# Patient Record
Sex: MALE | Race: BLACK OR AFRICAN AMERICAN | Employment: FULL TIME | URBAN - METROPOLITAN AREA
[De-identification: names, ages, dates, MRNs, and addresses within clinical notes are randomized per-mention and may not be internally consistent; named-entity substitution may affect disease eponyms.]

---

## 2018-04-24 ENCOUNTER — APPOINTMENT (OUTPATIENT)
Dept: GENERAL RADIOLOGY | Age: 63
DRG: 281 | End: 2018-04-24
Attending: PHYSICIAN ASSISTANT
Payer: COMMERCIAL

## 2018-04-24 ENCOUNTER — HOSPITAL ENCOUNTER (INPATIENT)
Age: 63
LOS: 2 days | Discharge: HOME | DRG: 281 | End: 2018-04-26
Attending: EMERGENCY MEDICINE | Admitting: HOSPITALIST
Payer: COMMERCIAL

## 2018-04-24 DIAGNOSIS — I21.4 NSTEMI (NON-ST ELEVATED MYOCARDIAL INFARCTION) (HCC): Primary | ICD-10-CM

## 2018-04-24 PROBLEM — I16.1 HYPERTENSIVE EMERGENCY: Status: ACTIVE | Noted: 2018-04-24

## 2018-04-24 LAB
ACT BLD: 143 SECS (ref 79–138)
ALBUMIN SERPL-MCNC: 3.8 G/DL (ref 3.4–5)
ALBUMIN/GLOB SERPL: 0.8 {RATIO} (ref 0.8–1.7)
ALP SERPL-CCNC: 87 U/L (ref 45–117)
ALT SERPL-CCNC: 31 U/L (ref 16–61)
AMPHET UR QL SCN: NEGATIVE
ANION GAP SERPL CALC-SCNC: 9 MMOL/L (ref 3–18)
APPEARANCE UR: CLEAR
APTT PPP: 31.2 SEC (ref 23–36.4)
APTT PPP: 62.9 SEC (ref 23–36.4)
AST SERPL-CCNC: 29 U/L (ref 15–37)
BACTERIA URNS QL MICRO: NEGATIVE /HPF
BARBITURATES UR QL SCN: NEGATIVE
BASOPHILS # BLD: 0 K/UL (ref 0–0.06)
BASOPHILS NFR BLD: 1 % (ref 0–2)
BENZODIAZ UR QL: NEGATIVE
BILIRUB SERPL-MCNC: 1.2 MG/DL (ref 0.2–1)
BILIRUB UR QL: NEGATIVE
BNP SERPL-MCNC: 1545 PG/ML (ref 0–900)
BUN SERPL-MCNC: 15 MG/DL (ref 7–18)
BUN/CREAT SERPL: 13 (ref 12–20)
CALCIUM SERPL-MCNC: 9 MG/DL (ref 8.5–10.1)
CANNABINOIDS UR QL SCN: NEGATIVE
CHLORIDE SERPL-SCNC: 104 MMOL/L (ref 100–108)
CK MB CFR SERPL CALC: 2.9 % (ref 0–4)
CK MB CFR SERPL CALC: 3.3 % (ref 0–4)
CK MB SERPL-MCNC: 1.9 NG/ML (ref 5–25)
CK MB SERPL-MCNC: 2 NG/ML (ref 5–25)
CK MB SERPL-MCNC: 2.1 NG/ML (ref 5–25)
CK MB SERPL-MCNC: 2.3 NG/ML (ref 5–25)
CK SERPL-CCNC: 64 U/L (ref 39–308)
CK SERPL-CCNC: 65 U/L (ref 39–308)
CK SERPL-CCNC: 69 U/L (ref 39–308)
CK SERPL-CCNC: 78 U/L (ref 39–308)
CO2 SERPL-SCNC: 28 MMOL/L (ref 21–32)
COCAINE UR QL SCN: NEGATIVE
COLOR UR: YELLOW
CREAT SERPL-MCNC: 1.15 MG/DL (ref 0.6–1.3)
DIFFERENTIAL METHOD BLD: ABNORMAL
EOSINOPHIL # BLD: 0.1 K/UL (ref 0–0.4)
EOSINOPHIL NFR BLD: 2 % (ref 0–5)
EPITH CASTS URNS QL MICRO: NORMAL /LPF (ref 0–5)
ERYTHROCYTE [DISTWIDTH] IN BLOOD BY AUTOMATED COUNT: 15.4 % (ref 11.6–14.5)
GLOBULIN SER CALC-MCNC: 4.8 G/DL (ref 2–4)
GLUCOSE BLD STRIP.AUTO-MCNC: 104 MG/DL (ref 70–110)
GLUCOSE BLD STRIP.AUTO-MCNC: 154 MG/DL (ref 70–110)
GLUCOSE SERPL-MCNC: 124 MG/DL (ref 74–99)
GLUCOSE UR STRIP.AUTO-MCNC: NEGATIVE MG/DL
HCT VFR BLD AUTO: 43.3 % (ref 36–48)
HDSCOM,HDSCOM: NORMAL
HGB BLD-MCNC: 14.3 G/DL (ref 13–16)
HGB UR QL STRIP: ABNORMAL
KETONES UR QL STRIP.AUTO: NEGATIVE MG/DL
LEUKOCYTE ESTERASE UR QL STRIP.AUTO: NEGATIVE
LYMPHOCYTES # BLD: 1.3 K/UL (ref 0.9–3.6)
LYMPHOCYTES NFR BLD: 35 % (ref 21–52)
MAGNESIUM SERPL-MCNC: 1.7 MG/DL (ref 1.6–2.6)
MCH RBC QN AUTO: 30.2 PG (ref 24–34)
MCHC RBC AUTO-ENTMCNC: 33 G/DL (ref 31–37)
MCV RBC AUTO: 91.5 FL (ref 74–97)
METHADONE UR QL: NEGATIVE
MONOCYTES # BLD: 0.3 K/UL (ref 0.05–1.2)
MONOCYTES NFR BLD: 8 % (ref 3–10)
NEUTS SEG # BLD: 2 K/UL (ref 1.8–8)
NEUTS SEG NFR BLD: 54 % (ref 40–73)
NITRITE UR QL STRIP.AUTO: NEGATIVE
OPIATES UR QL: NEGATIVE
PCP UR QL: NEGATIVE
PH UR STRIP: 6.5 [PH] (ref 5–8)
PLATELET # BLD AUTO: 217 K/UL (ref 135–420)
PMV BLD AUTO: 9.7 FL (ref 9.2–11.8)
POTASSIUM SERPL-SCNC: 3.5 MMOL/L (ref 3.5–5.5)
PROT SERPL-MCNC: 8.6 G/DL (ref 6.4–8.2)
PROT UR STRIP-MCNC: NEGATIVE MG/DL
RBC # BLD AUTO: 4.73 M/UL (ref 4.7–5.5)
RBC #/AREA URNS HPF: NORMAL /HPF (ref 0–5)
SODIUM SERPL-SCNC: 141 MMOL/L (ref 136–145)
SP GR UR REFRACTOMETRY: 1.01 (ref 1–1.03)
TROPONIN I SERPL-MCNC: 2.08 NG/ML (ref 0–0.06)
TROPONIN I SERPL-MCNC: 2.15 NG/ML (ref 0–0.06)
TROPONIN I SERPL-MCNC: 2.24 NG/ML (ref 0–0.06)
TROPONIN I SERPL-MCNC: 2.29 NG/ML (ref 0–0.06)
UROBILINOGEN UR QL STRIP.AUTO: 0.2 EU/DL (ref 0.2–1)
WBC # BLD AUTO: 3.7 K/UL (ref 4.6–13.2)
WBC URNS QL MICRO: NORMAL /HPF (ref 0–5)

## 2018-04-24 PROCEDURE — B2151ZZ FLUOROSCOPY OF LEFT HEART USING LOW OSMOLAR CONTRAST: ICD-10-PCS | Performed by: INTERNAL MEDICINE

## 2018-04-24 PROCEDURE — 82550 ASSAY OF CK (CPK): CPT | Performed by: PHYSICIAN ASSISTANT

## 2018-04-24 PROCEDURE — B4181ZZ FLUOROSCOPY OF BILATERAL RENAL ARTERIES USING LOW OSMOLAR CONTRAST: ICD-10-PCS | Performed by: INTERNAL MEDICINE

## 2018-04-24 PROCEDURE — B2111ZZ FLUOROSCOPY OF MULTIPLE CORONARY ARTERIES USING LOW OSMOLAR CONTRAST: ICD-10-PCS | Performed by: INTERNAL MEDICINE

## 2018-04-24 PROCEDURE — 71045 X-RAY EXAM CHEST 1 VIEW: CPT

## 2018-04-24 PROCEDURE — 85025 COMPLETE CBC W/AUTO DIFF WBC: CPT | Performed by: PHYSICIAN ASSISTANT

## 2018-04-24 PROCEDURE — 74011250636 HC RX REV CODE- 250/636: Performed by: PHYSICIAN ASSISTANT

## 2018-04-24 PROCEDURE — 74011250636 HC RX REV CODE- 250/636: Performed by: INTERNAL MEDICINE

## 2018-04-24 PROCEDURE — 36415 COLL VENOUS BLD VENIPUNCTURE: CPT | Performed by: PHYSICIAN ASSISTANT

## 2018-04-24 PROCEDURE — 96375 TX/PRO/DX INJ NEW DRUG ADDON: CPT

## 2018-04-24 PROCEDURE — 74011000250 HC RX REV CODE- 250: Performed by: PHYSICIAN ASSISTANT

## 2018-04-24 PROCEDURE — 99285 EMERGENCY DEPT VISIT HI MDM: CPT

## 2018-04-24 PROCEDURE — 65610000006 HC RM INTENSIVE CARE

## 2018-04-24 PROCEDURE — 96374 THER/PROPH/DIAG INJ IV PUSH: CPT

## 2018-04-24 PROCEDURE — 74011250637 HC RX REV CODE- 250/637: Performed by: INTERNAL MEDICINE

## 2018-04-24 PROCEDURE — 85347 COAGULATION TIME ACTIVATED: CPT

## 2018-04-24 PROCEDURE — 84443 ASSAY THYROID STIM HORMONE: CPT | Performed by: INTERNAL MEDICINE

## 2018-04-24 PROCEDURE — 85730 THROMBOPLASTIN TIME PARTIAL: CPT | Performed by: PHYSICIAN ASSISTANT

## 2018-04-24 PROCEDURE — 74011000250 HC RX REV CODE- 250: Performed by: INTERNAL MEDICINE

## 2018-04-24 PROCEDURE — 81001 URINALYSIS AUTO W/SCOPE: CPT | Performed by: PHYSICIAN ASSISTANT

## 2018-04-24 PROCEDURE — 4A023N7 MEASUREMENT OF CARDIAC SAMPLING AND PRESSURE, LEFT HEART, PERCUTANEOUS APPROACH: ICD-10-PCS | Performed by: INTERNAL MEDICINE

## 2018-04-24 PROCEDURE — 74011250637 HC RX REV CODE- 250/637: Performed by: HOSPITALIST

## 2018-04-24 PROCEDURE — 80053 COMPREHEN METABOLIC PANEL: CPT | Performed by: PHYSICIAN ASSISTANT

## 2018-04-24 PROCEDURE — 74011636320 HC RX REV CODE- 636/320: Performed by: INTERNAL MEDICINE

## 2018-04-24 PROCEDURE — 74011250636 HC RX REV CODE- 250/636

## 2018-04-24 PROCEDURE — 74011636637 HC RX REV CODE- 636/637: Performed by: HOSPITALIST

## 2018-04-24 PROCEDURE — 74011250637 HC RX REV CODE- 250/637: Performed by: PHYSICIAN ASSISTANT

## 2018-04-24 PROCEDURE — 80307 DRUG TEST PRSMV CHEM ANLYZR: CPT | Performed by: INTERNAL MEDICINE

## 2018-04-24 PROCEDURE — 93005 ELECTROCARDIOGRAM TRACING: CPT

## 2018-04-24 PROCEDURE — 83880 ASSAY OF NATRIURETIC PEPTIDE: CPT | Performed by: PHYSICIAN ASSISTANT

## 2018-04-24 PROCEDURE — C1894 INTRO/SHEATH, NON-LASER: HCPCS

## 2018-04-24 PROCEDURE — 82962 GLUCOSE BLOOD TEST: CPT

## 2018-04-24 PROCEDURE — 74011000250 HC RX REV CODE- 250

## 2018-04-24 PROCEDURE — 93306 TTE W/DOPPLER COMPLETE: CPT

## 2018-04-24 PROCEDURE — 83735 ASSAY OF MAGNESIUM: CPT | Performed by: INTERNAL MEDICINE

## 2018-04-24 RX ORDER — HEPARIN SODIUM 1000 [USP'U]/ML
50.4 INJECTION, SOLUTION INTRAVENOUS; SUBCUTANEOUS ONCE
Status: COMPLETED | OUTPATIENT
Start: 2018-04-24 | End: 2018-04-24

## 2018-04-24 RX ORDER — HEPARIN SODIUM 1000 [USP'U]/ML
INJECTION, SOLUTION INTRAVENOUS; SUBCUTANEOUS
Status: DISCONTINUED
Start: 2018-04-24 | End: 2018-04-24

## 2018-04-24 RX ORDER — ADHESIVE BANDAGE
30 BANDAGE TOPICAL DAILY PRN
Status: DISCONTINUED | OUTPATIENT
Start: 2018-04-24 | End: 2018-04-26 | Stop reason: HOSPADM

## 2018-04-24 RX ORDER — DEXTROSE 50 % IN WATER (D50W) INTRAVENOUS SYRINGE
50 AS NEEDED
Status: DISCONTINUED | OUTPATIENT
Start: 2018-04-24 | End: 2018-04-26 | Stop reason: HOSPADM

## 2018-04-24 RX ORDER — INSULIN LISPRO 100 [IU]/ML
INJECTION, SOLUTION INTRAVENOUS; SUBCUTANEOUS
Status: DISCONTINUED | OUTPATIENT
Start: 2018-04-24 | End: 2018-04-25

## 2018-04-24 RX ORDER — FENTANYL CITRATE 50 UG/ML
INJECTION, SOLUTION INTRAMUSCULAR; INTRAVENOUS
Status: COMPLETED
Start: 2018-04-24 | End: 2018-04-24

## 2018-04-24 RX ORDER — HEPARIN SODIUM 10000 [USP'U]/100ML
12-25 INJECTION, SOLUTION INTRAVENOUS
Status: DISCONTINUED | OUTPATIENT
Start: 2018-04-24 | End: 2018-04-24

## 2018-04-24 RX ORDER — SODIUM CHLORIDE 0.9 % (FLUSH) 0.9 %
5-10 SYRINGE (ML) INJECTION AS NEEDED
Status: DISCONTINUED | OUTPATIENT
Start: 2018-04-24 | End: 2018-04-26 | Stop reason: HOSPADM

## 2018-04-24 RX ORDER — MIDAZOLAM HYDROCHLORIDE 1 MG/ML
INJECTION, SOLUTION INTRAMUSCULAR; INTRAVENOUS
Status: COMPLETED
Start: 2018-04-24 | End: 2018-04-24

## 2018-04-24 RX ORDER — LIDOCAINE HYDROCHLORIDE 10 MG/ML
INJECTION, SOLUTION EPIDURAL; INFILTRATION; INTRACAUDAL; PERINEURAL
Status: COMPLETED
Start: 2018-04-24 | End: 2018-04-24

## 2018-04-24 RX ORDER — HYDRALAZINE HYDROCHLORIDE 20 MG/ML
10-20 INJECTION INTRAMUSCULAR; INTRAVENOUS
Status: DISCONTINUED | OUTPATIENT
Start: 2018-04-24 | End: 2018-04-24

## 2018-04-24 RX ORDER — NITROGLYCERIN 40 MG/100ML
0-200 INJECTION INTRAVENOUS
Status: DISCONTINUED | OUTPATIENT
Start: 2018-04-24 | End: 2018-04-26 | Stop reason: HOSPADM

## 2018-04-24 RX ORDER — HEPARIN SODIUM 1000 [USP'U]/ML
4000 INJECTION, SOLUTION INTRAVENOUS; SUBCUTANEOUS
Status: DISCONTINUED | OUTPATIENT
Start: 2018-04-24 | End: 2018-04-24 | Stop reason: HOSPADM

## 2018-04-24 RX ORDER — FENTANYL CITRATE 50 UG/ML
25-100 INJECTION, SOLUTION INTRAMUSCULAR; INTRAVENOUS
Status: DISCONTINUED | OUTPATIENT
Start: 2018-04-24 | End: 2018-04-24 | Stop reason: HOSPADM

## 2018-04-24 RX ORDER — FUROSEMIDE 10 MG/ML
40 INJECTION INTRAMUSCULAR; INTRAVENOUS
Status: COMPLETED | OUTPATIENT
Start: 2018-04-24 | End: 2018-04-24

## 2018-04-24 RX ORDER — HYDRALAZINE HYDROCHLORIDE 20 MG/ML
10 INJECTION INTRAMUSCULAR; INTRAVENOUS
Status: DISCONTINUED | OUTPATIENT
Start: 2018-04-24 | End: 2018-04-26 | Stop reason: HOSPADM

## 2018-04-24 RX ORDER — MAGNESIUM SULFATE HEPTAHYDRATE 40 MG/ML
2 INJECTION, SOLUTION INTRAVENOUS ONCE
Status: COMPLETED | OUTPATIENT
Start: 2018-04-24 | End: 2018-04-24

## 2018-04-24 RX ORDER — VERAPAMIL HYDROCHLORIDE 2.5 MG/ML
5 INJECTION, SOLUTION INTRAVENOUS ONCE
Status: COMPLETED | OUTPATIENT
Start: 2018-04-24 | End: 2018-04-24

## 2018-04-24 RX ORDER — LABETALOL HYDROCHLORIDE 5 MG/ML
20 INJECTION, SOLUTION INTRAVENOUS ONCE
Status: COMPLETED | OUTPATIENT
Start: 2018-04-24 | End: 2018-04-24

## 2018-04-24 RX ORDER — POTASSIUM CHLORIDE 20 MEQ/1
40 TABLET, EXTENDED RELEASE ORAL
Status: ACTIVE | OUTPATIENT
Start: 2018-04-24 | End: 2018-04-25

## 2018-04-24 RX ORDER — ASPIRIN 81 MG/1
81 TABLET ORAL DAILY
Status: DISCONTINUED | OUTPATIENT
Start: 2018-04-25 | End: 2018-04-24 | Stop reason: SDUPTHER

## 2018-04-24 RX ORDER — GUAIFENESIN 100 MG/5ML
243 LIQUID (ML) ORAL
Status: COMPLETED | OUTPATIENT
Start: 2018-04-24 | End: 2018-04-24

## 2018-04-24 RX ORDER — LIDOCAINE HYDROCHLORIDE 10 MG/ML
3-20 INJECTION, SOLUTION EPIDURAL; INFILTRATION; INTRACAUDAL; PERINEURAL
Status: DISCONTINUED | OUTPATIENT
Start: 2018-04-24 | End: 2018-04-26 | Stop reason: HOSPADM

## 2018-04-24 RX ORDER — HYDRALAZINE HYDROCHLORIDE 20 MG/ML
INJECTION INTRAMUSCULAR; INTRAVENOUS
Status: COMPLETED
Start: 2018-04-24 | End: 2018-04-24

## 2018-04-24 RX ORDER — PRAVASTATIN SODIUM 20 MG/1
20 TABLET ORAL
Status: DISCONTINUED | OUTPATIENT
Start: 2018-04-24 | End: 2018-04-26 | Stop reason: HOSPADM

## 2018-04-24 RX ORDER — SODIUM CHLORIDE 0.9 % (FLUSH) 0.9 %
5-10 SYRINGE (ML) INJECTION EVERY 8 HOURS
Status: DISCONTINUED | OUTPATIENT
Start: 2018-04-24 | End: 2018-04-26 | Stop reason: HOSPADM

## 2018-04-24 RX ORDER — MIDAZOLAM HYDROCHLORIDE 1 MG/ML
.5-2 INJECTION, SOLUTION INTRAMUSCULAR; INTRAVENOUS
Status: DISCONTINUED | OUTPATIENT
Start: 2018-04-24 | End: 2018-04-24 | Stop reason: HOSPADM

## 2018-04-24 RX ORDER — LISINOPRIL 20 MG/1
20 TABLET ORAL DAILY
Status: DISCONTINUED | OUTPATIENT
Start: 2018-04-25 | End: 2018-04-24 | Stop reason: SDUPTHER

## 2018-04-24 RX ORDER — LIDOCAINE HYDROCHLORIDE 10 MG/ML
INJECTION, SOLUTION EPIDURAL; INFILTRATION; INTRACAUDAL; PERINEURAL
Status: DISPENSED
Start: 2018-04-24 | End: 2018-04-25

## 2018-04-24 RX ORDER — DOCUSATE SODIUM 100 MG/1
100 CAPSULE, LIQUID FILLED ORAL 2 TIMES DAILY
Status: DISCONTINUED | OUTPATIENT
Start: 2018-04-24 | End: 2018-04-26 | Stop reason: HOSPADM

## 2018-04-24 RX ORDER — FAMOTIDINE 20 MG/1
20 TABLET, FILM COATED ORAL 2 TIMES DAILY
Status: DISCONTINUED | OUTPATIENT
Start: 2018-04-24 | End: 2018-04-26 | Stop reason: HOSPADM

## 2018-04-24 RX ORDER — FUROSEMIDE 40 MG/1
40 TABLET ORAL DAILY
Status: DISCONTINUED | OUTPATIENT
Start: 2018-04-25 | End: 2018-04-26 | Stop reason: HOSPADM

## 2018-04-24 RX ORDER — LISINOPRIL 20 MG/1
20 TABLET ORAL DAILY
Status: DISCONTINUED | OUTPATIENT
Start: 2018-04-24 | End: 2018-04-24

## 2018-04-24 RX ORDER — LABETALOL HCL 20 MG/4 ML
SYRINGE (ML) INTRAVENOUS
Status: DISPENSED
Start: 2018-04-24 | End: 2018-04-25

## 2018-04-24 RX ORDER — HEPARIN SODIUM 200 [USP'U]/100ML
INJECTION, SOLUTION INTRAVENOUS
Status: DISCONTINUED
Start: 2018-04-24 | End: 2018-04-24

## 2018-04-24 RX ORDER — METOPROLOL SUCCINATE 50 MG/1
50 TABLET, EXTENDED RELEASE ORAL DAILY
Status: DISCONTINUED | OUTPATIENT
Start: 2018-04-25 | End: 2018-04-24 | Stop reason: SDUPTHER

## 2018-04-24 RX ORDER — ENOXAPARIN SODIUM 100 MG/ML
40 INJECTION SUBCUTANEOUS EVERY 24 HOURS
Status: DISCONTINUED | OUTPATIENT
Start: 2018-04-24 | End: 2018-04-26 | Stop reason: HOSPADM

## 2018-04-24 RX ORDER — ACETAMINOPHEN 325 MG/1
650 TABLET ORAL
Status: DISCONTINUED | OUTPATIENT
Start: 2018-04-24 | End: 2018-04-26 | Stop reason: HOSPADM

## 2018-04-24 RX ORDER — ASPIRIN 81 MG/1
81 TABLET ORAL DAILY
Status: DISCONTINUED | OUTPATIENT
Start: 2018-04-25 | End: 2018-04-26 | Stop reason: HOSPADM

## 2018-04-24 RX ORDER — HEPARIN SODIUM 1000 [USP'U]/ML
1000-5000 INJECTION, SOLUTION INTRAVENOUS; SUBCUTANEOUS ONCE
Status: COMPLETED | OUTPATIENT
Start: 2018-04-24 | End: 2018-04-24

## 2018-04-24 RX ORDER — ADENOSINE 3 MG/ML
140 INJECTION, SOLUTION INTRAVENOUS ONCE
Status: DISCONTINUED | OUTPATIENT
Start: 2018-04-24 | End: 2018-04-24

## 2018-04-24 RX ORDER — MAGNESIUM SULFATE 100 %
16 CRYSTALS MISCELLANEOUS AS NEEDED
Status: DISCONTINUED | OUTPATIENT
Start: 2018-04-24 | End: 2018-04-26 | Stop reason: HOSPADM

## 2018-04-24 RX ORDER — SODIUM CHLORIDE 9 MG/ML
25 INJECTION, SOLUTION INTRAVENOUS CONTINUOUS
Status: DISCONTINUED | OUTPATIENT
Start: 2018-04-24 | End: 2018-04-24

## 2018-04-24 RX ORDER — METOPROLOL SUCCINATE 50 MG/1
50 TABLET, EXTENDED RELEASE ORAL DAILY
Status: DISCONTINUED | OUTPATIENT
Start: 2018-04-25 | End: 2018-04-24

## 2018-04-24 RX ORDER — HEPARIN SODIUM 200 [USP'U]/100ML
500 INJECTION, SOLUTION INTRAVENOUS
Status: DISCONTINUED | OUTPATIENT
Start: 2018-04-24 | End: 2018-04-24 | Stop reason: HOSPADM

## 2018-04-24 RX ORDER — VERAPAMIL HYDROCHLORIDE 2.5 MG/ML
INJECTION, SOLUTION INTRAVENOUS
Status: COMPLETED
Start: 2018-04-24 | End: 2018-04-24

## 2018-04-24 RX ADMIN — LIDOCAINE HYDROCHLORIDE 3 ML: 10 INJECTION, SOLUTION EPIDURAL; INFILTRATION; INTRACAUDAL; PERINEURAL at 17:14

## 2018-04-24 RX ADMIN — ENOXAPARIN SODIUM 40 MG: 40 INJECTION SUBCUTANEOUS at 21:19

## 2018-04-24 RX ADMIN — FENTANYL CITRATE 100 MCG: 50 INJECTION, SOLUTION INTRAMUSCULAR; INTRAVENOUS at 17:15

## 2018-04-24 RX ADMIN — VERAPAMIL HYDROCHLORIDE 3 ML: 2.5 INJECTION INTRAVENOUS at 17:24

## 2018-04-24 RX ADMIN — VERAPAMIL HYDROCHLORIDE 3 ML: 2.5 INJECTION INTRAVENOUS at 17:16

## 2018-04-24 RX ADMIN — VERAPAMIL HYDROCHLORIDE 2.5 MG: 2.5 INJECTION INTRAVENOUS at 17:23

## 2018-04-24 RX ADMIN — MIDAZOLAM HYDROCHLORIDE 2 MG: 1 INJECTION, SOLUTION INTRAMUSCULAR; INTRAVENOUS at 17:48

## 2018-04-24 RX ADMIN — NITROGLYCERIN 30 MCG/MIN: 40 INJECTION INTRAVENOUS at 13:06

## 2018-04-24 RX ADMIN — ACETAMINOPHEN 650 MG: 325 TABLET ORAL at 22:44

## 2018-04-24 RX ADMIN — IOPAMIDOL 150 ML: 510 INJECTION, SOLUTION INTRAVASCULAR at 17:38

## 2018-04-24 RX ADMIN — NITROGLYCERIN 1 INCH: 20 OINTMENT TOPICAL at 10:06

## 2018-04-24 RX ADMIN — MIDAZOLAM HYDROCHLORIDE 2 MG: 1 INJECTION, SOLUTION INTRAMUSCULAR; INTRAVENOUS at 17:15

## 2018-04-24 RX ADMIN — HEPARIN SODIUM 2000 UNITS: 1000 INJECTION, SOLUTION INTRAVENOUS; SUBCUTANEOUS at 17:21

## 2018-04-24 RX ADMIN — HEPARIN SODIUM 2000 UNITS: 1000 INJECTION, SOLUTION INTRAVENOUS; SUBCUTANEOUS at 17:35

## 2018-04-24 RX ADMIN — FENTANYL CITRATE 50 MCG: 50 INJECTION, SOLUTION INTRAMUSCULAR; INTRAVENOUS at 17:47

## 2018-04-24 RX ADMIN — HYDRALAZINE HYDROCHLORIDE 10 MG: 20 INJECTION INTRAMUSCULAR; INTRAVENOUS at 15:35

## 2018-04-24 RX ADMIN — FENTANYL CITRATE 25 MCG: 50 INJECTION, SOLUTION INTRAMUSCULAR; INTRAVENOUS at 17:34

## 2018-04-24 RX ADMIN — FAMOTIDINE 20 MG: 20 TABLET, FILM COATED ORAL at 21:19

## 2018-04-24 RX ADMIN — LABETALOL HYDROCHLORIDE 20 MG: 5 INJECTION, SOLUTION INTRAVENOUS at 10:06

## 2018-04-24 RX ADMIN — ASPIRIN 81 MG 243 MG: 81 TABLET ORAL at 08:58

## 2018-04-24 RX ADMIN — HYDRALAZINE HYDROCHLORIDE 10 MG: 20 INJECTION INTRAMUSCULAR; INTRAVENOUS at 17:25

## 2018-04-24 RX ADMIN — MIDAZOLAM HYDROCHLORIDE 1 MG: 1 INJECTION, SOLUTION INTRAMUSCULAR; INTRAVENOUS at 17:23

## 2018-04-24 RX ADMIN — FENTANYL CITRATE 50 MCG: 50 INJECTION, SOLUTION INTRAMUSCULAR; INTRAVENOUS at 17:22

## 2018-04-24 RX ADMIN — IOPAMIDOL 175 ML: 510 INJECTION, SOLUTION INTRAVASCULAR at 18:10

## 2018-04-24 RX ADMIN — HEPARIN SODIUM 12 UNITS/KG/HR: 10000 INJECTION, SOLUTION INTRAVENOUS at 10:23

## 2018-04-24 RX ADMIN — Medication 10 ML: at 21:32

## 2018-04-24 RX ADMIN — DOCUSATE SODIUM 100 MG: 100 CAPSULE, LIQUID FILLED ORAL at 21:19

## 2018-04-24 RX ADMIN — MAGNESIUM SULFATE HEPTAHYDRATE 2 G: 40 INJECTION, SOLUTION INTRAVENOUS at 21:18

## 2018-04-24 RX ADMIN — PRAVASTATIN SODIUM 20 MG: 20 TABLET ORAL at 21:19

## 2018-04-24 RX ADMIN — INSULIN LISPRO 2 UNITS: 100 INJECTION, SOLUTION INTRAVENOUS; SUBCUTANEOUS at 21:17

## 2018-04-24 RX ADMIN — FUROSEMIDE 40 MG: 10 INJECTION, SOLUTION INTRAMUSCULAR; INTRAVENOUS at 08:58

## 2018-04-24 RX ADMIN — NITROGLYCERIN 10 MCG/MIN: 40 INJECTION INTRAVENOUS at 12:35

## 2018-04-24 RX ADMIN — HEPARIN SODIUM 4000 UNITS: 1000 INJECTION, SOLUTION INTRAVENOUS; SUBCUTANEOUS at 10:21

## 2018-04-24 RX ADMIN — VERAPAMIL HYDROCHLORIDE 2.5 MG: 2.5 INJECTION, SOLUTION INTRAVENOUS at 17:23

## 2018-04-24 RX ADMIN — FENTANYL CITRATE 75 MCG: 50 INJECTION, SOLUTION INTRAMUSCULAR; INTRAVENOUS at 18:06

## 2018-04-24 NOTE — ED NOTES
TRANSFER - OUT REPORT:    Verbal report given to Alpa Cooper RN(name) on Kim Robison  being transferred to ICu(unit) for routine progression of care       Report consisted of patients Situation, Background, Assessment and   Recommendations(SBAR). Information from the following report(s) SBAR, MAR and Cardiac Rhythm NSR w PVC/ t wave abnormality was reviewed with the receiving nurse. Lines:   Peripheral IV 04/24/18 Right Antecubital (Active)   Site Assessment Clean, dry, & intact 4/24/2018  8:44 AM   Phlebitis Assessment 0 4/24/2018  8:44 AM   Infiltration Assessment 0 4/24/2018  8:44 AM   Dressing Status Clean, dry, & intact; Occlusive 4/24/2018  8:44 AM   Hub Color/Line Status Pink 4/24/2018  8:44 AM   Action Taken Blood drawn 4/24/2018  8:44 AM   Alcohol Cap Used Yes 4/24/2018  8:44 AM        Opportunity for questions and clarification was provided.       Patient transported with:   Registered Nurse

## 2018-04-24 NOTE — ED NOTES
Pt hourly rounding competed. Safety   Pt (x resting on stretcher with side rails up and call bell in reach. () in chair    () in parents arms. Toileting   Pt offered ()Bedpan     ()Assistance to Restroom     ()Urinal  Ongoing Updates  Updated on plan of care and status of test results.   Pain Management  Inquired as to comfort and offered comfort measures:    () warm blankets   () dimmed lights
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
## 2018-04-24 NOTE — IP AVS SNAPSHOT
[address] [address] 
[phone] Patient: Austen Joe MRN: [id number] NQD:[id number][id number] About your hospitalization You were admitted on:  April 24, 2018 You last received care in the:  [address] [place] [address] You were discharged on:  April 26, 2018 Why you were hospitalized Your primary diagnosis was:  Nstemi (Non-St Elevated Myocardial Infarction) (Hcc) Your diagnoses also included:  Hypertensive Emergency, Dm (Diabetes Mellitus) (Hcc) Follow-up Information Follow up With Details Comments Contact Info Aditya Mckeon MD  as scheduled [address] [facility] [address] 
[phone] Jonathan Irby MD On 4/27/2018 Follow up appt @2:45 [address] [facility] 
[phone] Dwaine Briggs MD On 5/8/2018 Follow up appt @ 2 pm [address] [facility] 150 
[phone] Discharge Orders None A check sushma indicates which time of day the medication should be taken. My Medications START taking these medications Instructions Each Dose to Equal  
 Morning Noon Evening Bedtime  
 isosorbide-hydrALAZINE 20-37.5 mg per tablet Commonly known as:  BIDIL Your last dose was: Your next dose is: Take 1 Tab by mouth three (3) times daily. 1 Tab  
    
   
   
   
  
 pravastatin 20 mg tablet Commonly known as:  PRAVACHOL Your last dose was: Your next dose is: Take 1 Tab by mouth nightly. 20 mg CHANGE how you take these medications Instructions Each Dose to Equal  
 Morning Noon Evening Bedtime  
 lisinopril 40 mg tablet Commonly known as:  Jesus Handing Start taking on:  4/27/2018 What changed:   
- medication strength 
- how much to take Your last dose was: Your next dose is: Take 1 Tab by mouth daily. 40 mg  
    
   
   
   
  
 potassium chloride 20 mEq packet Commonly known as:  KLOR-CON What changed:  when to take this Your last dose was: Your next dose is: Take 1 Packet by mouth daily. 20 mEq CONTINUE taking these medications Instructions Each Dose to Equal  
 Morning Noon Evening Bedtime  
 aspirin delayed-release 81 mg tablet Your last dose was: Your next dose is: Take 1 Tab by mouth daily. 81 mg  
    
   
   
   
  
 furosemide 40 mg tablet Commonly known as:  LASIX Your last dose was: Your next dose is: Take 1 Tab by mouth daily. 40 mg  
    
   
   
   
  
 metoprolol succinate 50 mg XL tablet Commonly known as:  TOPROL-XL Your last dose was: Your next dose is: Take 1 Tab by mouth daily. 50 mg  
    
   
   
   
  
  
STOP taking these medications   
 cloNIDine HCl 0.1 mg tablet Commonly known as:  CATAPRES  
   
  
 FISH OIL 1,000 mg Cap Generic drug:  omega-3 fatty acids-vitamin e LINZESS 290 mcg Cap capsule Generic drug:  linaclotide  
   
  
 metFORMIN 500 mg tablet Commonly known as:  GLUCOPHAGE Omeprazole delayed release 20 mg tablet Commonly known as:  PRILOSEC D/R Where to Get Your Medications These medications were sent to [address] [place] [address] Hours:  24-hours Phone:  [phone]  
  aspirin delayed-release 81 mg tablet  
 furosemide 40 mg tablet  
 isosorbide-hydrALAZINE 20-37.5 mg per tablet  
 lisinopril 40 mg tablet  
 metoprolol succinate 50 mg XL tablet  
 potassium chloride 20 mEq packet  
 pravastatin 20 mg tablet Discharge Instructions DISCHARGE SUMMARY from Nurse PATIENT INSTRUCTIONS: 
 
 
F-face looks uneven A-arms unable to move or move unevenly S-speech slurred or non-existent T-time-call 911 as soon as signs and symptoms begin-DO NOT go Back to bed or wait to see if you get better-TIME IS BRAIN. Warning Signs of HEART ATTACK Call 911 if you have these symptoms: 
? Chest discomfort. Most heart attacks involve discomfort in the center of the chest that lasts more than a few minutes, or that goes away and comes back. It can feel like uncomfortable pressure, squeezing, fullness, or pain. ? Discomfort in other areas of the upper body. Symptoms can include pain or discomfort in one or both arms, the back, neck, jaw, or stomach. ? Shortness of breath with or without chest discomfort. ? Other signs may include breaking out in a cold sweat, nausea, or lightheadedness. Don't wait more than five minutes to call [address]! Fast action can save your life. Calling 911 is almost always the fastest way to get lifesaving treatment. Emergency Medical Services staff can begin treatment when they arrive  up to an hour sooner than if someone gets to the hospital by car. The discharge information has been reviewed with the patient. The patient verbalized understanding. Discharge medications reviewed with the patient and appropriate educational materials and side effects teaching were provided. ___________________________________________________________________________________________________________________________________ DISCHARGE SUMMARY from Nurse PATIENT INSTRUCTIONS: 
 
 
F-face looks uneven A-arms unable to move or move unevenly S-speech slurred or non-existent T-time-call 911 as soon as signs and symptoms begin-DO NOT go Back to bed or wait to see if you get better-TIME IS BRAIN. Warning Signs of HEART ATTACK Call 911 if you have these symptoms: 
? Chest discomfort. Most heart attacks involve discomfort in the center of the chest that lasts more than a few minutes, or that goes away and comes back. It can feel like uncomfortable pressure, squeezing, fullness, or pain. ? Discomfort in other areas of the upper body. Symptoms can include pain or discomfort in one or both arms, the back, neck, jaw, or stomach. ? Shortness of breath with or without chest discomfort. ? Other signs may include breaking out in a cold sweat, nausea, or lightheadedness. Don't wait more than five minutes to call [address]! Fast action can save your life. Calling 911 is almost always the fastest way to get lifesaving treatment. Emergency Medical Services staff can begin treatment when they arrive  up to an hour sooner than if someone gets to the hospital by car. The discharge information has been reviewed with the patient. The patient verbalized understanding. Discharge medications reviewed with the patient and appropriate educational materials and side effects teaching were provided. ___________________________________________________________________________________________________________________________________PARACENTESIS DISCHARGE INSTRUCTIONS General Information: 
During this procedure, the doctor will insert a needle into the abdomen to drain fluid. After the procedure, you will be able to take a deep breath much easier. The site of the puncture may ooze the first day. This will decrease and eventually stop. Paracentesis (draining fluid from the abdomen) sometimes makes patients hypotensive (low blood pressure). Your doctor may order for you to receive fluids or albumin (a volume booster) during the procedure through an IV site. Home Care Instructions: 
Keep the puncture site clean and dry. No tub baths or swimming until puncture site heals. Showering is acceptable. Resume your normal diet, and resume your normal activity slowly and as you tolerate. If you are short of breath, rest. If shortness of breath does not ease, please call your ordering doctor. Fluid can re-accumulate in the chest and/or in the abdomen. If this should occur, your doctor needs to know as you may need to have the procedure done again. Call If: 
   You should call your Physician and/or the Radiology Nurse if you notice any signs of infection, like pus draining, or if it is swollen or reddened. Also call if you have a fever, or if you are bleeding from the puncture site more than a small amount on the dressing. Call if the puncture site keeps draining fluid. Some oozing is to be expected, but should slow and then stop. Call if you feel like you have pressure in your abdomen. SEEK IMMEDIATE CARE OR CALL 911 IF YOU SUDDENLY HAVE TROUBLE BREATHING, OR IF YOUR LIPS TURN BLUE, OR IF YOU NOTICE BLOOD IN YOUR SPUTUM. Follow-Up Instructions: Please see your ordering doctor as he/she has requested. To Reach Us:   
 
 
Date: 4/24/2018 Discharging Nurse: Sam Link RN Patient armband removed and shredded Introducing [facility] SERVICES! Dear Luisa Lee: 
Thank you for requesting a [employer] account. Our records indicate that you already have an active [employer] account. You can access your account anytime at [email or web address] Did you know that you can access your hospital and ER discharge instructions at any time in [employer]? You can also review all of your test results from your hospital stay or ER visit. Additional Information If you have questions, please visit the Frequently Asked Questions section of the [employer] website at [email or web address] Remember, MyChart is NOT to be used for urgent needs. For medical emergencies, dial 911. Now available from your iPhone and Android! Introducing Chauncey Richardson As a Jyl Ok patient, I wanted to make you aware of our electronic visit tool called Chauncey Richardson. [facility] 24/7 allows you to connect within minutes with a medical provider 24 hours a day, seven days a week via a mobile device or tablet or logging into a secure website from your computer. You can access Chauncey Richardson from anywhere in the [place]. A virtual visit might be right for you when you have a simple condition and feel like you just dont want to get out of bed, or cant get away from work for an appointment, when your regular yl [facility] provider is not available (evenings, weekends or holidays), or when youre out of town and need minor care. Electronic visits cost only $49 and if the St[employer] Fried[employer] 24/7 provider determines a prescription is needed to treat your condition, one can be electronically transmitted to a nearby pharmacy*. Please take a moment to enroll today if you have not already done so. The enrollment process is free and takes just a few minutes. To enroll, please download the St[employer] Fried[employer] 24/7 giorgio to your tablet or phone, or visit [email or web address] to enroll on your computer. And, as an [address] [place] [address] patient with a [employer] account, the results of your visits will be scanned into your electronic medical record and your primary care provider will be able to view the scanned results. We urge you to continue to see your regular yl [facility] provider for your ongoing medical care. And while your primary care provider may not be the one available when you seek a Chauncey Richardson virtual visit, the peace of mind you get from getting a real diagnosis real time can be priceless.    
 
For more information on Chauncey Jovanhenrryfin, view our Frequently Asked Questions (FAQs) at [email or web address]. Sincerely, 
 
Gaurav Purcell MD 
Chief Medical Officer Loyda Ba *:  certain medications cannot be prescribed via Chauncey Richardson Unresulted Labs-Please follow up with your PCP about these lab tests Order Current Status EKG, 12 LEAD, INITIAL Preliminary result EKG, 12 LEAD, SUBSEQUENT Preliminary result Providers Seen During Your Hospitalization Provider Specialty Primary office phone Bill Valencia MD Emergency Medicine [phone] Lon Hernández MD [facility] Practice [phone] Your Primary Care Physician (PCP) Primary Care Physician Office Phone Office Fax Jay Vee [phone] You are allergic to the following No active allergies Recent Documentation Height Weight BMI Smoking Status 1.676 m 78 kg 27.76 kg/m2 Never Smoker Emergency Contacts Name Discharge Info Relation Home Work Mobile Val Ann DISCHARGE CAREGIVER [3] Mother [14] [phone] Patient Belongings The following personal items are in your possession at time of discharge: 
     Visual Aid: None          Jewelry: None       Other Valuables: Cell Phone Please provide this summary of care documentation to your next provider. Signatures-by signing, you are acknowledging that this After Visit Summary has been reviewed with you and you have received a copy. Patient Signature:  ____________________________________________________________ Date:  ____________________________________________________________  
  
Camelia Grossman Provider Signature:  ____________________________________________________________ Date:  ____________________________________________________________
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
## 2018-04-24 NOTE — PROGRESS NOTES
Problem: Pressure Injury - Risk of  Goal: *Prevention of pressure injury  Document Clyde Scale and appropriate interventions in the flowsheet. Outcome: Progressing Towards Goal  Pressure Injury Interventions:             Activity Interventions: Pressure redistribution bed/mattress(bed type)    Mobility Interventions: Pressure redistribution bed/mattress (bed type)    Nutrition Interventions: Document food/fluid/supplement intake, Discuss nutritional consult with provider

## 2018-04-24 NOTE — ROUTINE PROCESS
Unable to put patient on cath table due to an emergent case. Transferred to care unit with cath RN. Family was not in waiting room to inform them of the delay.
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
## 2018-04-24 NOTE — ROUTINE PROCESS
,TRANSFER - OUT REPORT:    Verbal report given to RN BEDSIDE(name) on Kim Robison  being transferred to ICU(unit) for routine progression of care       Report consisted of patients Situation, Background, Assessment and   Recommendations(SBAR). Information from the following report(s) Procedure Summary, Recent Results, Med Rec Status and Cardiac Rhythm NSR was reviewed with the receiving nurse. Lines:   Peripheral IV 04/24/18 Right Antecubital (Active)   Site Assessment Clean, dry, & intact 4/24/2018  2:00 PM   Phlebitis Assessment 0 4/24/2018  2:00 PM   Infiltration Assessment 0 4/24/2018  2:00 PM   Dressing Status Clean, dry, & intact 4/24/2018  2:00 PM   Hub Color/Line Status Pink 4/24/2018  2:00 PM   Action Taken Blood drawn 4/24/2018  8:44 AM   Alcohol Cap Used Yes 4/24/2018  2:00 PM       Peripheral IV 04/24/18 Left Forearm (Active)   Site Assessment Clean, dry, & intact 4/24/2018  2:00 PM   Phlebitis Assessment 0 4/24/2018  2:00 PM   Infiltration Assessment 0 4/24/2018  2:00 PM   Dressing Status Clean, dry, & intact 4/24/2018  2:00 PM   Dressing Type Transparent 4/24/2018  2:00 PM   Hub Color/Line Status Pink 4/24/2018  2:00 PM   Alcohol Cap Used Yes 4/24/2018  2:00 PM        Opportunity for questions and clarification was provided. Patient transported with:   Lindsay Faulkner 19 REPORT, NO ANSWER IN ICU WHEN CALLED FROM CATH LAB POST CASE.

## 2018-04-24 NOTE — ROUTINE PROCESS
TRANSFER - IN REPORT:    Verbal report received from ROSLYN Ramirez(name) on NEREIDA Chung  being received from Care/cathlab(unit) for ordered procedure      Report consisted of patients Situation, Background, Assessment and   Recommendations(SBAR). Information from the following report(s) SBAR, Kardex, Intake/Output, MAR and Recent Results was reviewed with the receiving nurse. Opportunity for questions and clarification was provided. Assessment completed upon patients arrival to unit and care assumed.

## 2018-04-24 NOTE — CONSULTS
[id number] [facility]    Yeimy Diego.  MR#: [id number]  : 1955  ACCOUNT #: [de-identified]   DATE OF SERVICE: 2018    REASON FOR CONSULTATION:  Non-ST elevation MI, uncontrolled hypertension, and chest pain. HISTORY OF PRESENT ILLNESS:  The patient is a 71-year-old patient with history of chronic hypertension, diabetes mellitus, and dyslipidemia, somewhat noncompliant, tried to treat his condition with natural medication. He stopped taking medication from last couple of months, according to the patient because of issue with the insurance. The patient started having chest pain, started at rest 3 days ago, became worse, then came to the hospital today. His chest pain was central, nonradiating, more became exertional, relieved with rest also. Denied any fever, cough or pleuritic chest pain. The intensity of the chest pain was moderate. In the ER, the troponin is 2.29 with normal CK-MB and BNP is 1545. Creatinine is 1.15. Denied any trauma to the chest.  Denied any pleuritic chest pain as well. PAST MEDICAL HISTORY:  Hypertension, mild cardiomyopathy, diabetes mellitus, dyslipidemia. ALLERGIES:  THE PATIENT HAS NO KNOWN DRUG ALLERGIES. HOME MEDICATIONS:  Which he is not taking, as per chart includes metformin, metoprolol succinate, lisinopril, furosemide, clonidine, aspirin, and Prilosec. SOCIAL HISTORY:  Denied any active history of smoking or alcohol or drug abuse. FAMILY HISTORY:  Noncontributory. REVIEW OF SYSTEMS:  A 10-point review of systems completed. Positive pertinent findings discussed in the history of present illness. Rest of the systems are normal.    PHYSICAL EXAMINATION:    GENERAL:  At the time of consultation, a 59year-old gentleman, comfortable, not in any distress, not using any accessory muscles of respiration.   VITAL SIGNS:  Temperature is 98.7, heart rate is 76, respiration is 22, blood pressure is 199/90, pulse oximetry is 96%.  Weight is 79.4 kg. HEENT:  Head is atraumatic, normocephalic. NECK:  Supple, no JVD, no carotid bruit. HEART:  S1, S2 audible. Systolic murmur in the mitral area. Diastolic murmur in the aortic area. LUNGS:  Bilateral air entry positive. No added sounds. ABDOMEN:  Soft, nontender. Bowel sounds audible. EXTREMITIES:  No edema. Pulses are palpable. NEUROLOGIC:  No focal motor or sensory deficit. DERMATOLOGICAL:  No skin rash. MUSCULOSKELETAL:  No obvious joint deformity. LABORATORY DATA:  EKG sinus rhythm with lateral wall ST and T changes consistent with possible lateral wall ischemia. Hemoglobin 14.3, platelet count is 183. Sodium 141, potassium 3.5, creatinine 1.15. The echocardiogram has shown ejection fraction at 40% to 45% with moderate tricuspid regurgitation, moderate MR, and moderate aortic regurgitation. ASSESSMENT:  1.  Non-ST elevation MI.  2.  Uncontrolled hypertension because of lack of taking medications. 3.  Moderate aortic regurgitation. 4.  Moderate mitral regurgitation, moderate tricuspid regurgitation. 5.  Moderate to severe pulmonary hypertension. RECOMMENDATION:  I have started the patient on heparin and nitroglycerin. We will resume his antihypertensive treatment due to ongoing chest pain and elevated troponin and also pulmonary hypertension. We will consider a right and left heart catheterization, possible angioplasty. I have discussed in detail with the patient and family risks, benefits, and alternatives. The patient agreed to proceed. Rest of the recommendations after cardiac catheterization.       Clemente Alfonso MD MA / Kalyani Varghese  D: 04/24/2018 12:30     T: 04/24/2018 15:09  JOB #: [id number]
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
## 2018-04-24 NOTE — PROGRESS NOTES
I received report from Franny Medina [place], in the ED, by telephone on this patient. Patient arrived to room 101 in ICU at 1207. Pt is alert and oriented and states no pain and denies SOB. Pt urinating in urinal without complications of clear very light yellow urine in large amounts. PIV started in L FA and Dr. Irma Richardson in to see pt. Dr. Alice Resendiz in to see patient. Consent signed for L and R cardiac heart catheterization with intervention if needed. 1420  Pt taken by stretcher on monitor to cardiac cath lab in no distress. 1845  Pt returned from cath lab alert and awake. Report received at bedside from cath lab staff. Pt with TR band and immobilizer to right wrist.  No bleeding or hematoma observed. Pt states he has sensation in right hand. Pt denies pain and is asking for his cardiac diet. Family at bedside. No apparent distress with VSS on 60 mcg/min of Nitroglycerine drip. 1930  Report given to Greg Lawson RN, at bedside using SBAR format. Report included all clinical information and all questions answered.

## 2018-04-24 NOTE — ED TRIAGE NOTES
Pt states that he has had shortness of breath for several weeks, worse in the last few days with chest \"gas\" type pressure;   Pt with h/o same and enlarged heart and fluid retention

## 2018-04-25 LAB
ANION GAP SERPL CALC-SCNC: 11 MMOL/L (ref 3–18)
BUN SERPL-MCNC: 17 MG/DL (ref 7–18)
BUN/CREAT SERPL: 16 (ref 12–20)
CALCIUM SERPL-MCNC: 9.1 MG/DL (ref 8.5–10.1)
CHLORIDE SERPL-SCNC: 105 MMOL/L (ref 100–108)
CK MB CFR SERPL CALC: 3.3 % (ref 0–4)
CK MB CFR SERPL CALC: 3.6 % (ref 0–4)
CK MB SERPL-MCNC: 1.7 NG/ML (ref 5–25)
CK MB SERPL-MCNC: 2 NG/ML (ref 5–25)
CK SERPL-CCNC: 51 U/L (ref 39–308)
CK SERPL-CCNC: 56 U/L (ref 39–308)
CO2 SERPL-SCNC: 25 MMOL/L (ref 21–32)
CREAT SERPL-MCNC: 1.06 MG/DL (ref 0.6–1.3)
ERYTHROCYTE [DISTWIDTH] IN BLOOD BY AUTOMATED COUNT: 15.4 % (ref 11.6–14.5)
EST. AVERAGE GLUCOSE BLD GHB EST-MCNC: 103 MG/DL
GLUCOSE BLD STRIP.AUTO-MCNC: 115 MG/DL (ref 70–110)
GLUCOSE BLD STRIP.AUTO-MCNC: 121 MG/DL (ref 70–110)
GLUCOSE BLD STRIP.AUTO-MCNC: 136 MG/DL (ref 70–110)
GLUCOSE BLD STRIP.AUTO-MCNC: 170 MG/DL (ref 70–110)
GLUCOSE SERPL-MCNC: 113 MG/DL (ref 74–99)
HBA1C MFR BLD: 5.2 % (ref 4.5–5.6)
HCT VFR BLD AUTO: 40.3 % (ref 36–48)
HGB BLD-MCNC: 13.4 G/DL (ref 13–16)
MAGNESIUM SERPL-MCNC: 2.2 MG/DL (ref 1.6–2.6)
MCH RBC QN AUTO: 30.3 PG (ref 24–34)
MCHC RBC AUTO-ENTMCNC: 33.3 G/DL (ref 31–37)
MCV RBC AUTO: 91.2 FL (ref 74–97)
PLATELET # BLD AUTO: 190 K/UL (ref 135–420)
PMV BLD AUTO: 9.9 FL (ref 9.2–11.8)
POTASSIUM SERPL-SCNC: 3.2 MMOL/L (ref 3.5–5.5)
RBC # BLD AUTO: 4.42 M/UL (ref 4.7–5.5)
SODIUM SERPL-SCNC: 141 MMOL/L (ref 136–145)
TROPONIN I SERPL-MCNC: 2.08 NG/ML (ref 0–0.06)
TROPONIN I SERPL-MCNC: 2.34 NG/ML (ref 0–0.06)
TSH SERPL DL<=0.05 MIU/L-ACNC: 4.84 UIU/ML (ref 0.36–3.74)
WBC # BLD AUTO: 6.3 K/UL (ref 4.6–13.2)

## 2018-04-25 PROCEDURE — 82962 GLUCOSE BLOOD TEST: CPT

## 2018-04-25 PROCEDURE — 74011636637 HC RX REV CODE- 636/637: Performed by: INTERNAL MEDICINE

## 2018-04-25 PROCEDURE — 36415 COLL VENOUS BLD VENIPUNCTURE: CPT | Performed by: HOSPITALIST

## 2018-04-25 PROCEDURE — 85027 COMPLETE CBC AUTOMATED: CPT | Performed by: HOSPITALIST

## 2018-04-25 PROCEDURE — 74011250636 HC RX REV CODE- 250/636: Performed by: INTERNAL MEDICINE

## 2018-04-25 PROCEDURE — 65660000000 HC RM CCU STEPDOWN

## 2018-04-25 PROCEDURE — 84484 ASSAY OF TROPONIN QUANT: CPT | Performed by: HOSPITALIST

## 2018-04-25 PROCEDURE — 74011250637 HC RX REV CODE- 250/637: Performed by: INTERNAL MEDICINE

## 2018-04-25 PROCEDURE — 83735 ASSAY OF MAGNESIUM: CPT | Performed by: HOSPITALIST

## 2018-04-25 PROCEDURE — 83036 HEMOGLOBIN GLYCOSYLATED A1C: CPT | Performed by: INTERNAL MEDICINE

## 2018-04-25 PROCEDURE — 80048 BASIC METABOLIC PNL TOTAL CA: CPT | Performed by: HOSPITALIST

## 2018-04-25 PROCEDURE — 74011250637 HC RX REV CODE- 250/637: Performed by: HOSPITALIST

## 2018-04-25 RX ORDER — LISINOPRIL 20 MG/1
40 TABLET ORAL DAILY
Status: DISCONTINUED | OUTPATIENT
Start: 2018-04-25 | End: 2018-04-26 | Stop reason: HOSPADM

## 2018-04-25 RX ORDER — ISOSORBIDE DINITRATE AND HYDRALAZINE HYDROCHLORIDE 37.5; 2 MG/1; MG/1
1 TABLET ORAL 3 TIMES DAILY
Status: DISCONTINUED | OUTPATIENT
Start: 2018-04-25 | End: 2018-04-26 | Stop reason: HOSPADM

## 2018-04-25 RX ORDER — OXYCODONE AND ACETAMINOPHEN 5; 325 MG/1; MG/1
1 TABLET ORAL ONCE
Status: COMPLETED | OUTPATIENT
Start: 2018-04-25 | End: 2018-04-25

## 2018-04-25 RX ORDER — METOPROLOL SUCCINATE 50 MG/1
50 TABLET, EXTENDED RELEASE ORAL DAILY
Status: DISCONTINUED | OUTPATIENT
Start: 2018-04-25 | End: 2018-04-26 | Stop reason: HOSPADM

## 2018-04-25 RX ORDER — AMLODIPINE BESYLATE 5 MG/1
10 TABLET ORAL DAILY
Status: DISCONTINUED | OUTPATIENT
Start: 2018-04-25 | End: 2018-04-25

## 2018-04-25 RX ORDER — DEXTROSE 50 % IN WATER (D50W) INTRAVENOUS SYRINGE
25-50 AS NEEDED
Status: DISCONTINUED | OUTPATIENT
Start: 2018-04-25 | End: 2018-04-26 | Stop reason: HOSPADM

## 2018-04-25 RX ORDER — INSULIN LISPRO 100 [IU]/ML
INJECTION, SOLUTION INTRAVENOUS; SUBCUTANEOUS
Status: DISCONTINUED | OUTPATIENT
Start: 2018-04-25 | End: 2018-04-26 | Stop reason: HOSPADM

## 2018-04-25 RX ORDER — POTASSIUM CHLORIDE 20 MEQ/1
40 TABLET, EXTENDED RELEASE ORAL
Status: COMPLETED | OUTPATIENT
Start: 2018-04-25 | End: 2018-04-25

## 2018-04-25 RX ADMIN — POTASSIUM CHLORIDE 40 MEQ: 20 TABLET, EXTENDED RELEASE ORAL at 10:14

## 2018-04-25 RX ADMIN — FAMOTIDINE 20 MG: 20 TABLET, FILM COATED ORAL at 22:09

## 2018-04-25 RX ADMIN — FAMOTIDINE 20 MG: 20 TABLET, FILM COATED ORAL at 08:21

## 2018-04-25 RX ADMIN — METOPROLOL SUCCINATE 50 MG: 50 TABLET, FILM COATED, EXTENDED RELEASE ORAL at 10:14

## 2018-04-25 RX ADMIN — Medication 10 ML: at 14:02

## 2018-04-25 RX ADMIN — ACETAMINOPHEN 650 MG: 325 TABLET ORAL at 04:49

## 2018-04-25 RX ADMIN — DOCUSATE SODIUM 100 MG: 100 CAPSULE, LIQUID FILLED ORAL at 22:09

## 2018-04-25 RX ADMIN — OXYCODONE HYDROCHLORIDE AND ACETAMINOPHEN 1 TABLET: 5; 325 TABLET ORAL at 22:31

## 2018-04-25 RX ADMIN — DOCUSATE SODIUM 100 MG: 100 CAPSULE, LIQUID FILLED ORAL at 08:21

## 2018-04-25 RX ADMIN — INSULIN LISPRO 2 UNITS: 100 INJECTION, SOLUTION INTRAVENOUS; SUBCUTANEOUS at 11:22

## 2018-04-25 RX ADMIN — HYDRALAZINE HYDROCHLORIDE AND ISOSORBIDE DINITRATE 1 TABLET: 37.5; 2 TABLET, FILM COATED ORAL at 22:09

## 2018-04-25 RX ADMIN — ENOXAPARIN SODIUM 40 MG: 40 INJECTION SUBCUTANEOUS at 22:09

## 2018-04-25 RX ADMIN — HYDRALAZINE HYDROCHLORIDE 10 MG: 20 INJECTION INTRAMUSCULAR; INTRAVENOUS at 14:01

## 2018-04-25 RX ADMIN — HYDRALAZINE HYDROCHLORIDE AND ISOSORBIDE DINITRATE 1 TABLET: 37.5; 2 TABLET, FILM COATED ORAL at 14:21

## 2018-04-25 RX ADMIN — PRAVASTATIN SODIUM 20 MG: 20 TABLET ORAL at 22:09

## 2018-04-25 RX ADMIN — FUROSEMIDE 40 MG: 40 TABLET ORAL at 08:21

## 2018-04-25 RX ADMIN — ASPIRIN 81 MG: 81 TABLET, COATED ORAL at 08:21

## 2018-04-25 RX ADMIN — LISINOPRIL 40 MG: 20 TABLET ORAL at 10:14

## 2018-04-25 RX ADMIN — ACETAMINOPHEN 650 MG: 325 TABLET ORAL at 14:01

## 2018-04-25 RX ADMIN — Medication 1 CAPSULE: at 08:21

## 2018-04-25 NOTE — PROGRESS NOTES
1905-Bedside/Verbal report received from Los Cooney RN. Sbar, mar, labs, kardex and patient status reviewed. Assumed care of patient. 2000-Assessment completed. Patient remains drowsy post-cath procedure. Pulses palpable and no active bleeding at site. No hematoma formation noted. Pulse Ox on right hand with good pleth. TR band and immobilizer remains in place. 2100-Patient is more awake and alert at this time visiting with family members. Patient complains of headache 6-10 bilaterally behind eyes. Educated on Nitroglycerin drip and side effects. Lights dimmed for comfort. 2200-Headache continues at this time. Orders received from Dr. Tequila Jauregui to 650mg Tylenol orally. 2300-Per patient headache is very dull 2-10 at this time and is comfortable. 0000-Assessment completed. No changes from previous assessment. Nitro drip continues at previous rate. Deflated 5ml from TR band. Right extremity pulses palpable, warm, clean, dry and intact. 0400-Assessment completed. Resting quietly on room air. 5ml Air deflated from TR band. *See flowsheet  0445-In patients room to him standing at the foot of the bed. Oriented to call bell, returned to bed, and bed alarm turned on. Patient complains of headache 6-10 at this time. Tylenol given per PRN orders. 0650-All air out of TR band. TR Band removed, no bleeding, hematoma at site. Guaze and transparent dressing applied. Per patient request, Immobilizer removed for comfort. Educated on s/s of bleeding and to be careful with moving left arm.   0720-Bedside verbal report given to Erich Newton RN. Sbar, mar, labs, kardex, and patient status reviewed.
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
## 2018-04-25 NOTE — PROGRESS NOTES
Hospitalist Progress Note    Patient: Ailin Garay MRN: [id number]  CSN: [id number]    YOB: 1955  Age: 59 y.o. Sex: male    DOA: 4/24/2018 LOS:  LOS: 1 day                Assessment/Plan     Patient Active Problem List   Diagnosis Code    Hypertension I10    CHF (congestive heart failure) ([facility] I50.9    HAYS (dyspnea on exertion) R06.09    NSTEMI (non-ST elevated myocardial infarction) ([facility] I21.4    Hypertensive emergency I16.1    DM (diabetes mellitus) ([facility] E11.9            60 yo male admitted for chest pain    NSTEMI - seen by cardiology, s/p cath with findings of Mild to moderate nonobstructive coronary artery disease. The proximal RCA had 30-40% disease, and elsewhere coronary anatomy is patent with minimal luminal irregularity. Recommend aggressive medical management. Hypertensive emergency - improving. Started on toprol and lisinopril. BP still elevated, per cardiology will start on bidil. Follow BP, wean off nitro drip. Chronic systolic diastolic CHF - compensated, echo with Ef of 86%, grade 2 diastolic dysfunction. Continue with lasix. Replace potassium    Transfer to floor once off nitro drip. Disposition : 1-2 days    Review of systems  General: No fevers or chills. Cardiovascular: No chest pain or pressure. No palpitations. Pulmonary: No shortness of breath. Gastrointestinal: No nausea, vomiting. Physical Exam:  General: Awake, cooperative, no acute distress    HEENT: NC, Atraumatic. PERRLA, anicteric sclerae. Lungs: CTA Bilaterally. No Wheezing/Rhonchi/Rales. Heart:  Regular  rhythm,  No murmur, No Rubs, No Gallops  Abdomen: Soft, Non distended, Non tender.  +Bowel sounds,   Extremities: Trace edema bilaterally  Psych:   Not anxious or agitated. Neurologic:  No acute neurological deficit.            Vital signs/Intake and Output:  Visit Vitals    /80    Pulse 88    Temp 99.1 °F (37.3 °C)    Resp 16    Ht 5' 6\" (1.676 m)    Wt 79.4 kg (175 lb)    SpO2 96%    BMI 28.25 kg/m2     Current Shift:  04/25 0701 - 04/25 1900  In: 31.5 [I.V.:31.5]  Out: -   Last three shifts:  04/23 1901 - 04/25 0700  In: 514.6 [P.O.:440; I.V.:74.6]  Out: 2475 [Urine:2475]            Labs: Results:       Chemistry Recent Labs      04/25/18   0403  04/24/18   0844   GLU  113*  124*   NA  141  141   K  3.2*  3.5   CL  105  104   CO2  25  28   BUN  17  15   CREA  1.06  1.15   CA  9.1  9.0   AGAP  11  9   BUCR  16  13   AP   --   87   TP   --   8.6*   ALB   --   3.8   GLOB   --   4.8*   AGRAT   --   0.8      CBC w/Diff Recent Labs      04/25/18   0403  04/24/18   0844   WBC  6.3  3.7*   RBC  4.42*  4.73   HGB  13.4  14.3   HCT  40.3  43.3   PLT  190  217   GRANS   --   54   LYMPH   --   35   EOS   --   2      Cardiac Enzymes Recent Labs      04/25/18   1143  04/25/18   0403   CPK  51  56   CKND1  3.3  3.6      Coagulation Recent Labs      04/24/18   1600  04/24/18   0844   APTT  62.9*  31.2       Lipid Panel Lab Results   Component Value Date/Time    Cholesterol, total 126 10/02/2013 04:38 AM    HDL Cholesterol 33 (L) 10/02/2013 04:38 AM    LDL, calculated 82 10/02/2013 04:38 AM    VLDL, calculated 11 10/02/2013 04:38 AM    Triglyceride 55 10/02/2013 04:38 AM    CHOL/HDL Ratio 3.8 10/02/2013 04:38 AM      BNP No results for input(s): BNPP in the last 72 hours.    Liver Enzymes Recent Labs      04/24/18   0844   TP  8.6*   ALB  3.8   AP  87   SGOT  29      Thyroid Studies Lab Results   Component Value Date/Time    TSH 3.05 10/01/2013 05:31 AM        Procedures/imaging: see electronic medical records for all procedures/Xrays and details which were not copied into this note but were reviewed prior to creation of Plan

## 2018-04-25 NOTE — PROCEDURES
[address] Ver[place] Masters.  MR#: [id number]  : 1955  ACCOUNT #: [de-identified]   DATE OF SERVICE: 2018    INDICATION FOR THE PROCEDURE:  Non-ST elevation myocardial infarction, uncontrolled hypertension. PROCEDURES PERFORMED:  1. Left heart catheterization. 2.  Right heart catheterization. 3.  Selective coronary angiogram.  4.  Bilateral selective renal angiography. BLOOD LOSS:  Less than 50 mL. SPECIMENS REMOVED:  None. COUNSELING:  Risks, benefits and alternatives were discussed in detail with the patient and with the significant family member. Both agreed to proceed for the above procedure. PROCEDURE AND TECHNIQUE:  The patient was brought to the cardiac catheterization lab in a fasting and nonsedated state. The patient has history of uncontrolled hypertension and for some reason he started taking some natural supplement and stopped taking the medications and in spite of IV high-dose nitroglycerin and labetalol, his blood pressure was still high and that is one of the reasons I proceeded with bilateral renal angiography as well. The patient had a 6-Hungarian venous sheath in the antecubital area without any complication, and then right radial area was anesthetized with local anesthetic. A 6-Hungarian sheath was placed under fluoroscopic guidance without any complication. Please note that the patient had cystic swelling before getting the access in the right radial artery and that was nonaneurysmal and there was a great right radial artery. A 6-Hungarian sheath was placed under fluoroscopic guidance. A 5-Hungarian JR4 catheter was used to perform the left heart catheterization. LVEDP was 18 mmHg. There was minimal gradient from LV to aorta. There was 15 mm minimal gradient without any significant aortic stenosis.   The 5-Hungarian JR4 catheter was used to perform the selective angiography of the right coronary artery, and 5-Hungarian JL3 diagnostic catheter was used to perform the selective angiography of the left coronary system. The coronary angiography was completed without any complications, then 5-Slovak JR4 125 cm long catheter was used to perform the selective angiography of the bilateral renal arteries without any complication. The patient remained hemodynamically stable. The patient was given radial cocktail during the procedure. Radial sheath was pulled out, TR band was applied. A 6-Slovak [place] catheter was advanced under fluoroscopic guidance without any complication. Right-sided cardiac output and cardiac pressures were measured without any problem. Procedure completed, radial sheath was pulled out. FINDINGS:  CORONARY ANATOMY:  This is a right dominant coronary anatomy. LVEDP is 18 mmHg. Left main artery is a small vessel in length, moderate size in diameter, patent, trifurcated into LAD and the ramus intermedius and left circumflex artery. Patent left main artery. LAD:  LAD is patent in its ostium, proximal, mid and distal area with minimal luminal irregularity. D1 and D2 are also patent, without any significant disease. Large ramus intermedius branch, which further divided into multiple small branches, superiorly and inferiorly, without any significant critical disease. Left circumflex artery is a moderate-sized vessel, mild proximal luminal irregularity, gives rise to small OM 1 and 2 branches. Right coronary artery had proximal 30-40% lesion without any significant disease. Mid and distal RCA is patent. Small PDA and PLV branches are also patent. Overall, mild to moderate nonobstructive coronary artery disease. HEMODYNAMICS:    1. The LV pressure was 179/3 with LVEDP of 18 mmHg. 2.  Aortic pressure was 166/78. 3.  Pulmonary capillary wedge mean pressure was 15 mmHg, A wave was 15, V wave was 18 mmHg. 4.  PA was 67/15, mean PA pressure was 34 mmHg.   RV was 68/2, right atrial mean pressure was 10, A wave was 19 and V wave was 12 mmHg. Aortic saturation was 93% and PA saturation was 66%. Thermodilution cardiac output and cardiac index were 4.20 and 2.22 liters per minute per square meter. 5.  Karel cardiac output and cardiac index were 4.85 liters per minute and 2.56 liters per minute per square meter, and PVR was 3.9 Escobar unit. 6.  Bilateral renal arteries were patent. The right renal artery in the proximal area had mild aneurysmal, ectatic dilatation, without any significant disease. The left renal artery was also patent, without any significant disease. FINAL IMPRESSION:  1. Mild to moderate nonobstructive coronary artery disease. The proximal RCA had 30-40% disease, and elsewhere coronary anatomy is patent with minimal luminal irregularity. 2.  Moderately elevated right-sided pressure with moderate pulmonary hypertension. PVR was 3.92 Escobar unit. 3.  LVEDP is 18 mmHg. 4.  Patent bilateral renal arteries. The right renal artery has mild ectatic minimal luminal irregularity in the mid renal artery. RECOMMENDATION:  Continue aggressive risk factor management. I will start the patient on pravastatin. Continue with lisinopril, metoprolol. If blood pressure remains uncontrolled, then we will consider BiDil or isosorbide dinitrate with hydralazine on top of lisinopril and metoprolol. Discussed with the patient and family.       Pedro Luis Mcgill MD MA / RANI  D: 04/24/2018 18:38     T: 04/24/2018 21:38  JOB #: [id number]

## 2018-04-25 NOTE — PROGRESS NOTES
Reason for Admission:  NSTEMI                    RRAT Score:  9                   Plan for utilizing home health:  [facility]                        Likelihood of Readmission: no                          Transition of Care Plan: met with patient at bedside and attended IDR's informed pt has hx non-compliance with health, when cm spoke with pt at bedside denies being diabetic states he was only told in the past to monitor his blood sugar levels, has glucometer at home but no longer uses it,pt has been seen in the past by [facility] and was educated on diabetes along with home po diabetic regimen,pt is open to [facility] program for NSTEMI also will add diabetic education,cm did speak with diabetic educator Isael Elizabeth she plans on visiting pt after A1C  Results become available.

## 2018-04-25 NOTE — DIABETES MGMT
GLYCEMIC CONTROL & NUTRITION:    - Discussed in rounds, known h/o DM and Metformin previously  - no current A1C on file, recommend add to labs  - recommend add POCT + Humalog corrective coverage as well    Recent Glucose Results:   Lab Results   Component Value Date/Time     (H) 04/25/2018 04:03 AM    GLUCPOC 170 (H) 04/25/2018 11:14 AM    GLUCPOC 121 (H) 04/25/2018 06:52 AM    GLUCPOC 154 (H) 04/24/2018 08:58 PM     DEANNE Karimi, MPH, RD, CDE  Glycemic Control Team  Pager [phone] ([phone])

## 2018-04-25 NOTE — ROUTINE PROCESS
Bedside, Verbal and Written shift change report given to NEREIDA Munoz (oncoming nurse) by Sana Andrew (offgoing nurse). Report included the following information SBAR, Kardex, Intake/Output and Recent Results. 0900. Scheduled med's given, pt tolerating well, aox4 following commands, denies CP or SOB at this time. Remains telemetry monitored, room air, IV gtts. Dressings to right arm, clean dry intact, pulses palpable.  1000. Dr. Nikunj Paniagua at bedside assessing pt. PO med's ordered. 1230. Nitro gtt titrated off at this time. Pt remains awake, sitting up in bed, family at bedside. TRANSFER - OUT REPORT:    Verbal report given to ROSLYN Serrato(name) on Mercedes Velazquez  being transferred to [employer] (unit) for routine progression of care       Report consisted of patients Situation, Background, Assessment and   Recommendations(SBAR). Information from the following report(s) SBAR, Kardex, Procedure Summary and Intake/Output was reviewed with the receiving nurse.     Lines:   Peripheral IV 04/24/18 Right Antecubital (Active)   Site Assessment Clean, dry, & intact 4/24/2018  8:00 PM   Phlebitis Assessment 0 4/24/2018  8:00 PM   Infiltration Assessment 0 4/24/2018  8:00 PM   Dressing Status Clean, dry, & intact 4/24/2018  8:00 PM   Dressing Type Transparent 4/24/2018  8:00 PM   Hub Color/Line Status Pink 4/24/2018  8:00 PM   Action Taken Blood drawn 4/24/2018  8:00 PM   Alcohol Cap Used Yes 4/24/2018  8:00 PM       Peripheral IV 04/24/18 Left Forearm (Active)   Site Assessment Clean, dry, & intact 4/24/2018  8:00 PM   Phlebitis Assessment 0 4/24/2018  8:00 PM   Infiltration Assessment 0 4/24/2018  8:00 PM   Dressing Status Clean, dry, & intact 4/24/2018  8:00 PM   Dressing Type Transparent 4/24/2018  8:00 PM   Hub Color/Line Status Pink 4/24/2018  8:00 PM   Action Taken Open ports on tubing capped 4/24/2018  8:00 PM   Alcohol Cap Used Yes 4/24/2018  8:00 PM        Opportunity for questions and clarification was provided.       Patient transported with:   Monitor  Registered Nurse

## 2018-04-25 NOTE — ROUTINE PROCESS
Bedside and Verbal shift change report given to NEREIDA Sheets RN (oncoming nurse) by RIANA Gaming (offgoing nurse). Report included the following information SBAR, Kardex, Intake/Output and MAR.

## 2018-04-25 NOTE — ROUTINE PROCESS
TRANSFER - IN REPORT:    Verbal report received from Carol Milan RN(name) on Mercedes Velazquez  being received from ICU(unit) for routine progression of care      Report consisted of patients Situation, Background, Assessment and   Recommendations(SBAR). Information from the following report(s) SBAR, Intake/Output and Recent Results was reviewed with the receiving nurse. Opportunity for questions and clarification was provided. Assessment completed upon patients arrival to unit and care assumed.
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
## 2018-04-26 VITALS
SYSTOLIC BLOOD PRESSURE: 159 MMHG | WEIGHT: 172 LBS | BODY MASS INDEX: 27.64 KG/M2 | TEMPERATURE: 98 F | RESPIRATION RATE: 18 BRPM | DIASTOLIC BLOOD PRESSURE: 67 MMHG | OXYGEN SATURATION: 99 % | HEART RATE: 86 BPM | HEIGHT: 66 IN

## 2018-04-26 LAB
ANION GAP SERPL CALC-SCNC: 8 MMOL/L (ref 3–18)
BUN SERPL-MCNC: 17 MG/DL (ref 7–18)
BUN/CREAT SERPL: 15 (ref 12–20)
CALCIUM SERPL-MCNC: 8.6 MG/DL (ref 8.5–10.1)
CHLORIDE SERPL-SCNC: 104 MMOL/L (ref 100–108)
CK MB CFR SERPL CALC: ABNORMAL % (ref 0–4)
CK MB SERPL-MCNC: <1 NG/ML (ref 5–25)
CK SERPL-CCNC: 44 U/L (ref 39–308)
CO2 SERPL-SCNC: 27 MMOL/L (ref 21–32)
CREAT SERPL-MCNC: 1.17 MG/DL (ref 0.6–1.3)
ERYTHROCYTE [DISTWIDTH] IN BLOOD BY AUTOMATED COUNT: 15.8 % (ref 11.6–14.5)
GLUCOSE BLD STRIP.AUTO-MCNC: 131 MG/DL (ref 70–110)
GLUCOSE BLD STRIP.AUTO-MCNC: 87 MG/DL (ref 70–110)
GLUCOSE SERPL-MCNC: 121 MG/DL (ref 74–99)
HCT VFR BLD AUTO: 39.5 % (ref 36–48)
HGB BLD-MCNC: 13 G/DL (ref 13–16)
MAGNESIUM SERPL-MCNC: 1.8 MG/DL (ref 1.6–2.6)
MCH RBC QN AUTO: 30.4 PG (ref 24–34)
MCHC RBC AUTO-ENTMCNC: 32.9 G/DL (ref 31–37)
MCV RBC AUTO: 92.3 FL (ref 74–97)
PLATELET # BLD AUTO: 194 K/UL (ref 135–420)
PMV BLD AUTO: 9.7 FL (ref 9.2–11.8)
POTASSIUM SERPL-SCNC: 3.9 MMOL/L (ref 3.5–5.5)
RBC # BLD AUTO: 4.28 M/UL (ref 4.7–5.5)
SODIUM SERPL-SCNC: 139 MMOL/L (ref 136–145)
TROPONIN I SERPL-MCNC: 2.2 NG/ML (ref 0–0.06)
WBC # BLD AUTO: 9 K/UL (ref 4.6–13.2)

## 2018-04-26 PROCEDURE — 74011250637 HC RX REV CODE- 250/637: Performed by: HOSPITALIST

## 2018-04-26 PROCEDURE — 82962 GLUCOSE BLOOD TEST: CPT

## 2018-04-26 PROCEDURE — 74011250637 HC RX REV CODE- 250/637: Performed by: INTERNAL MEDICINE

## 2018-04-26 PROCEDURE — 80048 BASIC METABOLIC PNL TOTAL CA: CPT | Performed by: HOSPITALIST

## 2018-04-26 PROCEDURE — 36415 COLL VENOUS BLD VENIPUNCTURE: CPT | Performed by: HOSPITALIST

## 2018-04-26 PROCEDURE — 85027 COMPLETE CBC AUTOMATED: CPT | Performed by: HOSPITALIST

## 2018-04-26 PROCEDURE — 82553 CREATINE MB FRACTION: CPT | Performed by: INTERNAL MEDICINE

## 2018-04-26 PROCEDURE — 83735 ASSAY OF MAGNESIUM: CPT | Performed by: HOSPITALIST

## 2018-04-26 RX ORDER — POTASSIUM CHLORIDE 1.5 G/1.77G
20 POWDER, FOR SOLUTION ORAL DAILY
Qty: 30 EACH | Refills: 5 | Status: SHIPPED | OUTPATIENT
Start: 2018-04-26 | End: 2019-07-02

## 2018-04-26 RX ORDER — LISINOPRIL 40 MG/1
40 TABLET ORAL DAILY
Qty: 30 TAB | Refills: 5 | Status: SHIPPED | OUTPATIENT
Start: 2018-04-27 | End: 2019-07-02

## 2018-04-26 RX ORDER — ISOSORBIDE DINITRATE AND HYDRALAZINE HYDROCHLORIDE 37.5; 2 MG/1; MG/1
1 TABLET ORAL 3 TIMES DAILY
Qty: 90 TAB | Refills: 5 | Status: ON HOLD | OUTPATIENT
Start: 2018-04-26 | End: 2019-06-30 | Stop reason: CLARIF

## 2018-04-26 RX ORDER — METOPROLOL SUCCINATE 50 MG/1
50 TABLET, EXTENDED RELEASE ORAL DAILY
Qty: 30 TAB | Refills: 5 | Status: ON HOLD | OUTPATIENT
Start: 2018-04-26 | End: 2019-06-30 | Stop reason: CLARIF

## 2018-04-26 RX ORDER — ASPIRIN 81 MG/1
81 TABLET ORAL DAILY
Qty: 30 TAB | Refills: 5 | Status: SHIPPED | OUTPATIENT
Start: 2018-04-26

## 2018-04-26 RX ORDER — PRAVASTATIN SODIUM 20 MG/1
20 TABLET ORAL
Qty: 30 TAB | Refills: 5 | Status: SHIPPED | OUTPATIENT
Start: 2018-04-26

## 2018-04-26 RX ORDER — FUROSEMIDE 40 MG/1
40 TABLET ORAL DAILY
Qty: 30 TAB | Refills: 5 | Status: SHIPPED | OUTPATIENT
Start: 2018-04-26

## 2018-04-26 RX ADMIN — ASPIRIN 81 MG: 81 TABLET, COATED ORAL at 10:12

## 2018-04-26 RX ADMIN — Medication 10 ML: at 07:17

## 2018-04-26 RX ADMIN — HYDRALAZINE HYDROCHLORIDE AND ISOSORBIDE DINITRATE 1 TABLET: 37.5; 2 TABLET, FILM COATED ORAL at 10:28

## 2018-04-26 RX ADMIN — DOCUSATE SODIUM 100 MG: 100 CAPSULE, LIQUID FILLED ORAL at 10:12

## 2018-04-26 RX ADMIN — METOPROLOL SUCCINATE 50 MG: 50 TABLET, FILM COATED, EXTENDED RELEASE ORAL at 10:12

## 2018-04-26 RX ADMIN — FUROSEMIDE 40 MG: 40 TABLET ORAL at 10:12

## 2018-04-26 RX ADMIN — FAMOTIDINE 20 MG: 20 TABLET, FILM COATED ORAL at 10:12

## 2018-04-26 RX ADMIN — Medication 1 CAPSULE: at 10:13

## 2018-04-26 RX ADMIN — LISINOPRIL 40 MG: 20 TABLET ORAL at 10:12

## 2018-04-26 NOTE — PROGRESS NOTES
Chart reviewed. Met with patient at bedside during IDRs. Pt will dc today. Pt has a cane and walker at home, if needed. Pt states he has not recently needed to use any DME for ambulation. Pt has no concerns pertaining to dc. CM will cont to be available. Care Management Interventions  PCP Verified by CM: Yes  Mode of Transport at Discharge:  Other (see comment) (family)  Transition of Care Consult (CM Consult): Discharge Planning  Current Support Network: Own Home  Confirm Follow Up Transport: Self  Plan discussed with Pt/Family/Caregiver: Yes  Discharge Location  Discharge Placement: Home with family assistance

## 2018-04-26 NOTE — PROGRESS NOTES
7416 Assumed patient care from off going nurse NEREIDA Sam RN. Patient is alert x 4 resting in bed and appears to be in no sign of distress. Bed left in lowest position with bed left in lowest position and whiteboard updated.

## 2018-04-26 NOTE — PROGRESS NOTES
2231: Pt. C/o 7.5/10 bilateral leg pain, On assessment capillary refill < 3sec, temperature warm and color appropriate for ethnicity; Dr. Olmstead Many made aware of persistent leg pain, Order obtained to administer percocet 5/325 once, order read back and verified     0635: Shift Summary: Shift otherwise uneventful, Pt. Resting quietly remainder of shift    0739: Bedside shift change report given to RIANA Montes (oncoming nurse) by Jace Jarvis (offgoing nurse). Report included the following information SBAR and Kardex.

## 2018-04-26 NOTE — DISCHARGE INSTRUCTIONS
DISCHARGE SUMMARY from Nurse    PATIENT INSTRUCTIONS:    After general anesthesia or intravenous sedation, for 24 hours or while taking prescription Narcotics:  · Limit your activities  · Do not drive and operate hazardous machinery  · Do not make important personal or business decisions  · Do  not drink alcoholic beverages  · If you have not urinated within 8 hours after discharge, please contact your surgeon on call. Report the following to your surgeon:  · Excessive pain, swelling, redness or odor of or around the surgical area  · Temperature over 100.5  · Nausea and vomiting lasting longer than 4 hours or if unable to take medications  · Any signs of decreased circulation or nerve impairment to extremity: change in color, persistent  numbness, tingling, coldness or increase pain  · Any questions    What to do at Home:  Recommended activity: Activity as tolerated. Please follow up with primary care provider. *  Please give a list of your current medications to your Primary Care Provider. *  Please update this list whenever your medications are discontinued, doses are      changed, or new medications (including over-the-counter products) are added. *  Please carry medication information at all times in case of emergency situations. These are general instructions for a healthy lifestyle:    No smoking/ No tobacco products/ Avoid exposure to second hand smoke  Surgeon General's Warning:  Quitting smoking now greatly reduces serious risk to your health.     Obesity, smoking, and sedentary lifestyle greatly increases your risk for illness    A healthy diet, regular physical exercise & weight monitoring are important for maintaining a healthy lifestyle    You may be retaining fluid if you have a history of heart failure or if you experience any of the following symptoms:  Weight gain of 3 pounds or more overnight or 5 pounds in a week, increased swelling in our hands or feet or shortness of breath while lying flat in bed. Please call your doctor as soon as you notice any of these symptoms; do not wait until your next office visit. Recognize signs and symptoms of STROKE:    F-face looks uneven    A-arms unable to move or move unevenly    S-speech slurred or non-existent    T-time-call 911 as soon as signs and symptoms begin-DO NOT go       Back to bed or wait to see if you get better-TIME IS BRAIN. Warning Signs of HEART ATTACK     Call 911 if you have these symptoms:   Chest discomfort. Most heart attacks involve discomfort in the center of the chest that lasts more than a few minutes, or that goes away and comes back. It can feel like uncomfortable pressure, squeezing, fullness, or pain.  Discomfort in other areas of the upper body. Symptoms can include pain or discomfort in one or both arms, the back, neck, jaw, or stomach.  Shortness of breath with or without chest discomfort.  Other signs may include breaking out in a cold sweat, nausea, or lightheadedness. Don't wait more than five minutes to call 911 - MINUTES MATTER! Fast action can save your life. Calling 911 is almost always the fastest way to get lifesaving treatment. Emergency Medical Services staff can begin treatment when they arrive -- up to an hour sooner than if someone gets to the hospital by car. The discharge information has been reviewed with the patient. The patient verbalized understanding. Discharge medications reviewed with the patient and appropriate educational materials and side effects teaching were provided.   ___________________________________________________________________________________________________________________________________    DISCHARGE SUMMARY from Nurse    PATIENT INSTRUCTIONS:    After general anesthesia or intravenous sedation, for 24 hours or while taking prescription Narcotics:  · Limit your activities  · Do not drive and operate hazardous machinery  · Do not make important personal or business decisions  · Do  not drink alcoholic beverages  · If you have not urinated within 8 hours after discharge, please contact your surgeon on call. Report the following to your surgeon:  · Excessive pain, swelling, redness or odor of or around the surgical area  · Temperature over 100.5  · Nausea and vomiting lasting longer than 4 hours or if unable to take medications  · Any signs of decreased circulation or nerve impairment to extremity: change in color, persistent  numbness, tingling, coldness or increase pain  · Any questions    What to do at Home:  Recommended activity: Activity as tolerated and no driving for today,     *  Please give a list of your current medications to your Primary Care Provider. *  Please update this list whenever your medications are discontinued, doses are      changed, or new medications (including over-the-counter products) are added. *  Please carry medication information at all times in case of emergency situations. These are general instructions for a healthy lifestyle:    No smoking/ No tobacco products/ Avoid exposure to second hand smoke  Surgeon General's Warning:  Quitting smoking now greatly reduces serious risk to your health. Obesity, smoking, and sedentary lifestyle greatly increases your risk for illness    A healthy diet, regular physical exercise & weight monitoring are important for maintaining a healthy lifestyle    You may be retaining fluid if you have a history of heart failure or if you experience any of the following symptoms:  Weight gain of 3 pounds or more overnight or 5 pounds in a week, increased swelling in our hands or feet or shortness of breath while lying flat in bed. Please call your doctor as soon as you notice any of these symptoms; do not wait until your next office visit.     Recognize signs and symptoms of STROKE:    F-face looks uneven    A-arms unable to move or move unevenly    S-speech slurred or non-existent    T-time-call 911 as soon as signs and symptoms begin-DO NOT go       Back to bed or wait to see if you get better-TIME IS BRAIN. Warning Signs of HEART ATTACK     Call 911 if you have these symptoms:   Chest discomfort. Most heart attacks involve discomfort in the center of the chest that lasts more than a few minutes, or that goes away and comes back. It can feel like uncomfortable pressure, squeezing, fullness, or pain.  Discomfort in other areas of the upper body. Symptoms can include pain or discomfort in one or both arms, the back, neck, jaw, or stomach.  Shortness of breath with or without chest discomfort.  Other signs may include breaking out in a cold sweat, nausea, or lightheadedness. Don't wait more than five minutes to call 911 - MINUTES MATTER! Fast action can save your life. Calling 911 is almost always the fastest way to get lifesaving treatment. Emergency Medical Services staff can begin treatment when they arrive -- up to an hour sooner than if someone gets to the hospital by car. The discharge information has been reviewed with the patient. The patient verbalized understanding. Discharge medications reviewed with the patient and appropriate educational materials and side effects teaching were provided. ___________________________________________________________________________________________________________________________________PARACENTESIS DISCHARGE INSTRUCTIONS    General Information:  During this procedure, the doctor will insert a needle into the abdomen to drain fluid. After the procedure, you will be able to take a deep breath much easier. The site of the puncture may ooze the first day. This will decrease and eventually stop. Paracentesis (draining fluid from the abdomen) sometimes makes patients hypotensive (low blood pressure). Your doctor may order for you to receive fluids or albumin (a volume booster) during the procedure through an IV site.      Home Care Instructions:  Keep the puncture site clean and dry. No tub baths or swimming until puncture site heals. Showering is acceptable. Resume your normal diet, and resume your normal activity slowly and as you tolerate. If you are short of breath, rest. If shortness of breath does not ease, please call your ordering doctor. Fluid can re-accumulate in the chest and/or in the abdomen. If this should occur, your doctor needs to know as you may need to have the procedure done again. Call If:     You should call your Physician and/or the Radiology Nurse if you notice any signs of infection, like pus draining, or if it is swollen or reddened. Also call if you have a fever, or if you are bleeding from the puncture site more than a small amount on the dressing. Call if the puncture site keeps draining fluid. Some oozing is to be expected, but should slow and then stop. Call if you feel like you have pressure in your abdomen. SEEK IMMEDIATE CARE OR CALL 911 IF YOU SUDDENLY HAVE TROUBLE BREATHING, OR IF YOUR LIPS TURN BLUE, OR IF YOU NOTICE BLOOD IN YOUR SPUTUM. Follow-Up Instructions: Please see your ordering doctor as he/she has requested.      To Reach Us:        Date: 4/24/2018  Discharging Nurse: Dominique Snyder RN   Patient armband removed and shredded
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
## 2018-04-27 NOTE — DISCHARGE SUMMARY
Discharge Summary    Patient: Ailin Garay MRN: [id number]  CSN: [id number]    YOB: 1955  Age: 59 y.o. Sex: male    DOA: 4/24/2018 LOS:  LOS: 2 days   Discharge Date: 4/26/2018     Primary Care Provider:  Lizabeth Leonardo MD    Admission Diagnoses: NSTEMI (non-ST elevated myocardial infarction) [facility])    Discharge Diagnoses:    Problem List as of 4/26/2018  Date Reviewed: 10/1/2013          Codes Class Noted - Resolved    * (Principal)NSTEMI (non-ST elevated myocardial infarction) ([facility] ICD-10-CM: I21.4  ICD-9-CM: 410.70  4/24/2018 - Present        Hypertensive emergency ICD-10-CM: I16.1  ICD-9-CM: 401.9  4/24/2018 - Present        DM (diabetes mellitus) ([facility] ICD-10-CM: E11.9  ICD-9-CM: 250.00  Unknown - Present        Hypertension ICD-10-CM: I10  ICD-9-CM: 401.9  Unknown - Present        CHF (congestive heart failure) ([facility] ICD-10-CM: I50.9  ICD-9-CM: 428.0  10/1/2013 - Present        HAYS (dyspnea on exertion) ICD-10-CM: R06.09  ICD-9-CM: 786.09  10/1/2013 - Present              Discharge Medications:     Discharge Medication List as of 4/26/2018  2:28 PM      START taking these medications    Details   isosorbide-hydrALAZINE (BIDIL) 20-37.5 mg per tablet Take 1 Tab by mouth three (3) times daily. , Normal, Disp-90 Tab, R-5      pravastatin (PRAVACHOL) 20 mg tablet Take 1 Tab by mouth nightly., Normal, Disp-30 Tab, R-5         CONTINUE these medications which have CHANGED    Details   aspirin delayed-release 81 mg tablet Take 1 Tab by mouth daily. , Normal, Disp-30 Tab, R-5      furosemide (LASIX) 40 mg tablet Take 1 Tab by mouth daily. , Normal, Disp-30 Tab, R-5      lisinopril (PRINIVIL, ZESTRIL) 40 mg tablet Take 1 Tab by mouth daily. , Normal, Disp-30 Tab, R-5      metoprolol succinate (TOPROL-XL) 50 mg XL tablet Take 1 Tab by mouth daily. , Normal, Disp-30 Tab, R-5      potassium chloride (KLOR-CON) 20 mEq packet Take 1 Packet by mouth daily. , Normal, Disp-30 Each, R-5         STOP taking these medications       Omeprazole delayed release (PRILOSEC D/R) 20 mg tablet Comments:   Reason for Stopping:         omega-3 fatty acids-vitamin e (FISH OIL) 1,000 mg cap Comments:   Reason for Stopping:         LINZESS 290 mcg cap capsule Comments:   Reason for Stopping:         cloNIDine (CATAPRES) 0.1 mg tablet Comments:   Reason for Stopping:         metFORMIN (GLUCOPHAGE) 500 mg tablet Comments:   Reason for Stopping:               Discharge Condition: Good    Procedures : cardiac cath    Consults: Cardiology      PHYSICAL EXAM   Visit Vitals    /67 (BP 1 Location: Left arm, BP Patient Position: At rest)    Pulse 86    Temp 98 °F (36.7 °C)    Resp 18    Ht 5' 6\" (1.676 m)    Wt 78 kg (172 lb)    SpO2 99%    BMI 27.76 kg/m2     General: Awake, cooperative, no acute distress    HEENT: NC, Atraumatic. PERRLA, EOMI. Anicteric sclerae. Lungs:  CTA Bilaterally. No Wheezing/Rhonchi/Rales. Heart:  Regular  rhythm,  No murmur, No Rubs, No Gallops  Abdomen: Soft, Non distended, Non tender. +Bowel sounds,   Extremities: No c/c/e  Psych:   Not anxious or agitated. Neurologic:  No acute neurological deficits. Admission HPI : Mercedes Velazquez is a 59 y.o. male who has past history of DM, HTN, CHF, GERD presents to ER with complains of chest pain and SOB. Patient reports that he has been having left sided chest pain for long time (daughter reports for months). His second cousin passed away recently and over the weekend his chest pain got worse. He thought it is gas pain and he took some antacids with no relief. Located left side of chest, no radiation. Not associated with nausea or diaphoresis. Associated with SOB. Pain is on and off. His SOB is also getting worse. He is getting SOB on minimal exertion. He is also having trouble laying flat, he needs 2-3 pillows. Denies any smoking, quit years ago.    He has history of HTN and CHF, last seen a physician about a year ago. Has not been taking any medications for about 9 months. In ER he is noted to have elevated BP of 224/98, oxygen sats of 85%. His initial CE with troponin of 2.29, elevated NT pro BNP of 1545. EKG with sinus rhythm, T wave abnormality in lateral leads. Hospital Course :   NSTEMI - patient admitted to ICU, he was started on heparin and nitro drip. His cardiac enzymes trended. He was taken to cath lab by cardiology and underwent right and left cath with selective angiogram and bilateral selective renal angiography with the following findings. 1.  Mild to moderate nonobstructive coronary artery disease. The proximal RCA had 30-40% disease, and elsewhere coronary anatomy is patent with minimal luminal irregularity. 2.  Moderately elevated right-sided pressure with moderate pulmonary hypertension. PVR was 3.92 Escobar unit. 3.  LVEDP is 18 mmHg. 4.  Patent bilateral renal arteries. The right renal artery has mild ectatic minimal luminal irregularity in the mid renal artery. Cardiology recommended aggressive medical management of risk factors. HTN - emergency, he was started on betablocker, lisinopril, lasix, later added Bidil per cardiology recommendation. His BP are now better controlled. Chronic systolic diastolic CHF - Echo showed EF of 62%, grade 2 diastolic dysfunction. RV severely dilated. All valves with mild - mod regurgitation. Patient extensively counseled on compliance with medications and follow up. Recommend follow up with PCP and follow up BMP. Follow up with cardiology in 2 weeks.     Activity: Activity as tolerated    Diet: Diabetic Diet    Follow-up: PCP, cardiology    Disposition: home    Minutes spent on discharge: 50       Labs: Results:       Chemistry Recent Labs      04/26/18   0303  04/25/18   0403   GLU  121*  113*   NA  139  141   K  3.9  3.2*   CL  104  105   CO2  27  25   BUN  17  17   CREA  1.17  1.06   CA  8.6  9.1   AGAP  8  11   BUCR  15  16      CBC w/Diff Recent Labs      04/26/18   0303  04/25/18   0403   WBC  9.0  6.3   RBC  4.28*  4.42*   HGB  13.0  13.4   HCT  39.5  40.3   PLT  194  190      Cardiac Enzymes Recent Labs      04/26/18   0303  04/25/18   1143   CPK  44  51   CKND1  CALCULATION NOT PERFORMED WHEN RESULT IS BELOW LINEAR LIMIT  3.3      Coagulation Recent Labs      04/24/18   1600   APTT  62.9*       Lipid Panel Lab Results   Component Value Date/Time    Cholesterol, total 126 10/02/2013 04:38 AM    HDL Cholesterol 33 (L) 10/02/2013 04:38 AM    LDL, calculated 82 10/02/2013 04:38 AM    VLDL, calculated 11 10/02/2013 04:38 AM    Triglyceride 55 10/02/2013 04:38 AM    CHOL/HDL Ratio 3.8 10/02/2013 04:38 AM      BNP No results for input(s): BNPP in the last 72 hours. Liver Enzymes No results for input(s): TP, ALB, TBIL, AP, SGOT, GPT in the last 72 hours. No lab exists for component: DBIL   Thyroid Studies Lab Results   Component Value Date/Time    TSH 4.84 (H) 04/24/2018 10:00 PM            Significant Diagnostic Studies: Xr Chest Port    Result Date: 4/24/2018  History: Shortness of breath Technique: AP CHEST: Portable chest obtained at 8:56 AM on 04/24/18 and is compared to the prior exam of 10/01/13. Findings: Cardiopericardial silhouette is enlarged. There is prominence of the perihilar vasculature similar to the prior. No saige consolidation, congestive heart failure, pleural effusion or pneumothorax is seen. IMPRESSION: Prominent perihilar vasculature which may reflect mild acute or chronic congestion. Chronic enlargement of cardiopericardial silhouette. No results found for this or any previous visit.         CC: Rip Rudd MD

## 2018-04-29 LAB
ATRIAL RATE: 89 BPM
ATRIAL RATE: 93 BPM
CALCULATED P AXIS, ECG09: 45 DEGREES
CALCULATED P AXIS, ECG09: 49 DEGREES
CALCULATED R AXIS, ECG10: -16 DEGREES
CALCULATED R AXIS, ECG10: -16 DEGREES
CALCULATED T AXIS, ECG11: 90 DEGREES
CALCULATED T AXIS, ECG11: 96 DEGREES
DIAGNOSIS, 93000: NORMAL
DIAGNOSIS, 93000: NORMAL
P-R INTERVAL, ECG05: 174 MS
P-R INTERVAL, ECG05: 174 MS
Q-T INTERVAL, ECG07: 410 MS
Q-T INTERVAL, ECG07: 414 MS
QRS DURATION, ECG06: 100 MS
QRS DURATION, ECG06: 106 MS
QTC CALCULATION (BEZET), ECG08: 498 MS
QTC CALCULATION (BEZET), ECG08: 514 MS
VENTRICULAR RATE, ECG03: 89 BPM
VENTRICULAR RATE, ECG03: 93 BPM

## 2018-10-28 ENCOUNTER — APPOINTMENT (OUTPATIENT)
Dept: GENERAL RADIOLOGY | Age: 63
End: 2018-10-28
Attending: EMERGENCY MEDICINE
Payer: COMMERCIAL

## 2018-10-28 ENCOUNTER — HOSPITAL ENCOUNTER (EMERGENCY)
Age: 63
Discharge: ANOTHER HOSPITAL | End: 2018-10-28
Attending: EMERGENCY MEDICINE
Payer: COMMERCIAL

## 2018-10-28 VITALS
SYSTOLIC BLOOD PRESSURE: 144 MMHG | OXYGEN SATURATION: 99 % | WEIGHT: 190 LBS | BODY MASS INDEX: 33.66 KG/M2 | HEART RATE: 74 BPM | TEMPERATURE: 98.4 F | DIASTOLIC BLOOD PRESSURE: 57 MMHG | RESPIRATION RATE: 20 BRPM | HEIGHT: 63 IN

## 2018-10-28 DIAGNOSIS — T50.905A ADVERSE EFFECT OF DRUG, INITIAL ENCOUNTER: ICD-10-CM

## 2018-10-28 DIAGNOSIS — I21.4 NON-ST ELEVATED MYOCARDIAL INFARCTION (HCC): ICD-10-CM

## 2018-10-28 DIAGNOSIS — R00.2 PALPITATIONS: Primary | ICD-10-CM

## 2018-10-28 LAB
ALBUMIN SERPL-MCNC: 3.8 G/DL (ref 3.4–5)
ALBUMIN/GLOB SERPL: 0.9 {RATIO} (ref 0.8–1.7)
ALP SERPL-CCNC: 58 U/L (ref 45–117)
ALT SERPL-CCNC: 29 U/L (ref 16–61)
ANION GAP SERPL CALC-SCNC: 16 MMOL/L (ref 3–18)
AST SERPL-CCNC: 28 U/L (ref 15–37)
BASOPHILS # BLD: 0 K/UL (ref 0–0.1)
BASOPHILS NFR BLD: 0 % (ref 0–2)
BILIRUB SERPL-MCNC: 1.1 MG/DL (ref 0.2–1)
BUN SERPL-MCNC: 23 MG/DL (ref 7–18)
BUN/CREAT SERPL: 20
CALCIUM SERPL-MCNC: 9.6 MG/DL (ref 8.5–10.1)
CHLORIDE SERPL-SCNC: 99 MMOL/L (ref 100–108)
CK MB CFR SERPL CALC: 2.2 % (ref 0–4)
CK MB CFR SERPL CALC: 3.4 % (ref 0–4)
CK MB SERPL-MCNC: 1.8 NG/ML (ref 5–25)
CK MB SERPL-MCNC: 2.7 NG/ML (ref 5–25)
CK SERPL-CCNC: 79 U/L (ref 39–308)
CK SERPL-CCNC: 81 U/L (ref 39–308)
CO2 SERPL-SCNC: 24 MMOL/L (ref 21–32)
CREAT SERPL-MCNC: 1.15 MG/DL (ref 0.6–1.3)
DIFFERENTIAL METHOD BLD: NORMAL
EOSINOPHIL # BLD: 0.1 K/UL (ref 0–0.4)
EOSINOPHIL NFR BLD: 1 % (ref 0–5)
ERYTHROCYTE [DISTWIDTH] IN BLOOD BY AUTOMATED COUNT: 13.2 % (ref 11.6–14.5)
GLOBULIN SER CALC-MCNC: 4.4 G/DL (ref 2–4)
GLUCOSE SERPL-MCNC: 104 MG/DL (ref 74–99)
HCT VFR BLD AUTO: 47 % (ref 36–48)
HGB BLD-MCNC: 16 G/DL (ref 13–16)
LYMPHOCYTES # BLD: 1.5 K/UL (ref 0.9–3.6)
LYMPHOCYTES NFR BLD: 29 % (ref 21–52)
MAGNESIUM SERPL-MCNC: 1.6 MG/DL (ref 1.6–2.6)
MCH RBC QN AUTO: 30.4 PG (ref 24–34)
MCHC RBC AUTO-ENTMCNC: 34 G/DL (ref 31–37)
MCV RBC AUTO: 89.4 FL (ref 74–97)
MONOCYTES # BLD: 0.4 K/UL (ref 0.05–1.2)
MONOCYTES NFR BLD: 9 % (ref 3–10)
NEUTS SEG # BLD: 3.1 K/UL (ref 1.8–8)
NEUTS SEG NFR BLD: 61 % (ref 40–73)
PLATELET # BLD AUTO: 239 K/UL (ref 135–420)
PMV BLD AUTO: 10.1 FL (ref 9.2–11.8)
POTASSIUM SERPL-SCNC: 3.4 MMOL/L (ref 3.5–5.5)
PROT SERPL-MCNC: 8.2 G/DL (ref 6.4–8.2)
RBC # BLD AUTO: 5.26 M/UL (ref 4.7–5.5)
SODIUM SERPL-SCNC: 139 MMOL/L (ref 136–145)
TROPONIN I SERPL-MCNC: 1.9 NG/ML (ref 0–0.04)
TROPONIN I SERPL-MCNC: 2.19 NG/ML (ref 0–0.04)
WBC # BLD AUTO: 5.1 K/UL (ref 4.6–13.2)

## 2018-10-28 PROCEDURE — 71045 X-RAY EXAM CHEST 1 VIEW: CPT

## 2018-10-28 PROCEDURE — 80053 COMPREHEN METABOLIC PANEL: CPT | Performed by: EMERGENCY MEDICINE

## 2018-10-28 PROCEDURE — 83735 ASSAY OF MAGNESIUM: CPT | Performed by: EMERGENCY MEDICINE

## 2018-10-28 PROCEDURE — 85025 COMPLETE CBC W/AUTO DIFF WBC: CPT | Performed by: EMERGENCY MEDICINE

## 2018-10-28 PROCEDURE — 93005 ELECTROCARDIOGRAM TRACING: CPT

## 2018-10-28 PROCEDURE — 74011250637 HC RX REV CODE- 250/637: Performed by: EMERGENCY MEDICINE

## 2018-10-28 PROCEDURE — 99285 EMERGENCY DEPT VISIT HI MDM: CPT

## 2018-10-28 PROCEDURE — 82550 ASSAY OF CK (CPK): CPT | Performed by: EMERGENCY MEDICINE

## 2018-10-28 RX ORDER — ISOSORBIDE DINITRATE AND HYDRALAZINE HYDROCHLORIDE 37.5; 2 MG/1; MG/1
1 TABLET ORAL
Status: ON HOLD | COMMUNITY
End: 2019-06-30 | Stop reason: CLARIF

## 2018-10-28 RX ORDER — GUAIFENESIN 100 MG/5ML
81 LIQUID (ML) ORAL
Status: COMPLETED | OUTPATIENT
Start: 2018-10-28 | End: 2018-10-28

## 2018-10-28 RX ADMIN — Medication 81 MG: at 12:19
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
## 2018-10-28 NOTE — ED NOTES
Telephone report given to [address] RN at [facility], all questions answered at this time

## 2018-10-28 NOTE — ED NOTES
Pt hourly rounding competed. Safety   Pt (*) resting on stretcher with side rails up and call bell in reach. () in chair    () in parents arms. Toileting   Pt offered ()Bedpan     (*)Assistance to Restroom     ()Urinal  Ongoing Updates  Updated on plan of care and status of test results.   Pain Management  Inquired as to comfort and offered comfort measures:    (*) warm blankets   (*) dimmed lights

## 2018-10-28 NOTE — ROUTINE PROCESS
TRANSFER - OUT REPORT:    Verbal report given to Minerva Villatoro RN on Emile Love  being transferred [facility] for routine progression of care       Report consisted of patients Situation, Background, Assessment and   Recommendations(SBAR). Information from the following report(s) SBAR, ED Summary. Medications, Labs,  was reviewed with the receiving nurse. Lines:   Peripheral IV 10/28/18 Right Antecubital (Active)   Site Assessment Clean, dry, & intact 10/28/2018 11:35 AM   Phlebitis Assessment 0 10/28/2018 11:35 AM   Infiltration Assessment 0 10/28/2018 11:35 AM   Dressing Status Clean, dry, & intact 10/28/2018 11:35 AM   Dressing Type Transparent 10/28/2018 11:35 AM   Hub Color/Line Status Pink 10/28/2018 11:35 AM   Action Taken Blood drawn 10/28/2018 11:35 AM   Alcohol Cap Used Yes 10/28/2018 11:35 AM        Opportunity for questions and clarification was provided.       Patient transported with:   Monitor, ALS transport

## 2018-10-28 NOTE — ED TRIAGE NOTES
Patient ambulate to ED with family member, patient states he feels like his heart is racing and SOB at 1000 1 hour after taking new medication Bidil.   A/o x3, speaking in complete sentences, denies chest pain

## 2018-12-25 LAB
ATRIAL RATE: 89 BPM
CALCULATED P AXIS, ECG09: 60 DEGREES
CALCULATED R AXIS, ECG10: 21 DEGREES
CALCULATED T AXIS, ECG11: 72 DEGREES
DIAGNOSIS, 93000: NORMAL
P-R INTERVAL, ECG05: 158 MS
Q-T INTERVAL, ECG07: 396 MS
QRS DURATION, ECG06: 104 MS
QTC CALCULATION (BEZET), ECG08: 481 MS
VENTRICULAR RATE, ECG03: 89 BPM

## 2019-01-16 NOTE — ED NOTES
History of Present Illness


General


Chief Complaint:  Altered Mental Status


Reason for Consultation:  venous access, critical care





Present Illness


HPI


77-year-old female, who was admitted to [facility] for 

pyelonephritis and altered mental status. The pt is agitated and confused. in 

restraints. The pt is easily agitated


Allergies:  


Coded Allergies:  


     No Known Allergies (Unverified , 11/26/18)





Medication History


Scheduled


Alprazolam* (Xanax*), 1 MG ORAL TID, (Reported)


Amino Acids/Protein Hydrolys (Pro-Stat Liquid), 30 ML ORAL TWICE A DAY, (

Reported)


Amiodarone Hcl* (Amiodarone Hcl*), 200 MG ORAL EVERY 12 HOURS, (Reported)


Amiodarone Hcl* (Pacerone*), 200 MG GT DAILY


Apixaban (Eliquis), 2.5 MG GT BID


Ascorbic Acid* (Vitamin C*), 500 MG ORAL DAILY, (Reported)


Docusate Sodium* (Docusate Sodium*), 100 MG ORAL TWICE A DAY, (Reported)


Epoetin Rupesh (Procrit), 10,000 UNITS SUBQ MON-WED-FRI


Gabapentin* (Gabapentin*), 300 MG ORAL BEDTIME, (Reported)


Lansoprazole* (Lansoprazole*), 30 MG GT Q12HR


Metoprolol Tartrate* (Metoprolol Tartrate*), 50 MG ORAL EVERY 12 HOURS, (

Reported)


Minoxidil (Minoxidil), 2.5 MG GT Q12HR


Mupirocin* (Mupirocin*), 1 APPLIC TOPIC THREE TIMES A DAY, (Reported)


Pantoprazole* (Pantoprazole*), 40 MG ORAL DAILY, (Reported)


Quetiapine Fumarate* (Seroquel*), 25 MG ORAL EVERY 6 HOURS


Sertraline Hcl* (Sertraline Hcl*), 100 MG ORAL DAILY, (Reported)


Sevelamer Carbonate* (Renvela*), 800 MG ORAL THREE TIMES A DAY, (Reported)





Scheduled PRN


Acetaminophen* (Acetaminophen 325MG Tablet*), 650 MG ORAL Q4H PRN for Pain 

Scale (3-5), (Reported)


Dextran 70/Hypromellose (Artificial Tears Eye Drops*), 1 DROP BOTH EYES Q4H PRN


Hydrocodone Bit/Acetaminophen 5-325* (Norco 5-325*), 1 TAB GT Q6H PRN


Zolpidem Tartrate* (Ambien*), 5 MG ORAL HSPRN PRN


[Acetaminophen], 650 MG GT Q6H PRN





Miscellaneous Medications


Apixaban (Eliquis), 2.5 MG PO, (Reported)


Sennosides/Docusate Sodium (Senna Laxative Tablet), 2 EACH PO, (Reported)





Patient History


Limited by:  medical condition


History Provided By:  Medical Record, PMD


Healthcare decision maker


SHALONDA DUVALL


Resuscitation status


Full Code


Advanced Directive on File


Yes





Past Medical/Surgical History


Past Medical/Surgical History:  


(1) Bradycardia


(2) Sepsis


(3) Hypotension


(4) ESRD (end stage renal disease)


(5) Hypoalbuminemia


(6) Decubital ulcer


(7) Paroxysmal A-fib


(8) Dehydration


(9) Severe malnutrition


(10) Encounter for PEG (percutaneous endoscopic gastrostomy)


(11) Encephalopathy chronic





Review of Systems


Psychiatric:  Reports: anxiety, emotional problems, hallucinations





Physical Exam


General Appearance:  alert, confused, agitated, obese





Last 24 Hour Vital Signs








  Date Time  Temp Pulse Resp B/P (MAP) Pulse Ox O2 Delivery O2 Flow Rate FiO2


 


1/15/19 22:55  102 18     40


 


1/15/19 21:30  104 18     40


 


1/15/19 21:10 98.3       


 


1/15/19 20:34    145/73    


 


1/15/19 20:00      Mechanical Ventilator  





      Mechanical Ventilator  


 


1/15/19 20:00  115      


 


1/15/19 20:00 98.4 115 18 145/73 (97) 98   


 


1/15/19 20:00        40


 


1/15/19 19:29  109 20     40


 


1/15/19 17:15  75 16     40


 


1/15/19 16:00      Mechanical Ventilator  





      Mechanical Ventilator  


 


1/15/19 16:00        40


 


1/15/19 16:00 97.9 115 20 144/70 (94) 100   


 


1/15/19 15:41  114      


 


1/15/19 15:21  122 19     40


 


1/15/19 13:47  134 18     40


 


1/15/19 12:00 98.1 70 22 130/42 (71) 100   


 


1/15/19 12:00        40


 


1/15/19 12:00      Mechanical Ventilator  





      Mechanical Ventilator  


 


1/15/19 11:51  117      


 


1/15/19 11:03  110 23     40


 


1/15/19 08:50    150/65    


 


1/15/19 08:41  129 26     40


 


1/15/19 08:00        40


 


1/15/19 08:00 97.5 113 22 150/65 (93) 100   


 


1/15/19 08:00      Mechanical Ventilator  





      Mechanical Ventilator  


 


1/15/19 07:55  122      


 


1/15/19 07:10  120 16     40


 


1/15/19 05:20 97.3       


 


1/15/19 05:12  119 22     40


 


1/15/19 04:00  129      


 


1/15/19 04:00 97.3 104 21 103/62 (76) 100   


 


1/15/19 04:00        40


 


1/15/19 04:00      Mechanical Ventilator  





      Mechanical Ventilator  


 


1/15/19 03:02  111 20     40


 


1/15/19 01:28  96 19     40

















Intake and Output  


 


 1/15/19 1/16/19





 19:00 07:00


 


Intake Total 615 ml 


 


Balance 615 ml 


 


  


 


Free Water 60 ml 


 


IV Total 115 ml 


 


Tube Feeding 440 ml 


 


# Voids 2 


 


# Bowel Movements 100 











Laboratory Tests








Test


  1/15/19


03:15 1/15/19


18:00


 


White Blood Count


  8.0 K/UL


(4.8-10.8) 


 


 


Red Blood Count


  2.83 M/UL


(4.20-5.40)  L 


 


 


Hemoglobin


  8.6 G/DL


(12.0-16.0)  #L 


 


 


Hematocrit


  26.8 %


(37.0-47.0)  L 


 


 


Mean Corpuscular Volume 95 FL (80-99)   


 


Mean Corpuscular Hemoglobin


  30.2 PG


(27.0-31.0) 


 


 


Mean Corpuscular Hemoglobin


Concent 31.9 G/DL


(32.0-36.0)  L 


 


 


Red Cell Distribution Width


  16.2 %


(11.6-14.8)  H 


 


 


Platelet Count


  432 K/UL


(150-450) 


 


 


Mean Platelet Volume


  6.0 FL


(6.5-10.1)  L 


 


 


Neutrophils (%) (Auto)


  79.1 %


(45.0-75.0)  H 


 


 


Lymphocytes (%) (Auto)


  7.1 %


(20.0-45.0)  L 


 


 


Monocytes (%) (Auto)


  9.8 %


(1.0-10.0) 


 


 


Eosinophils (%) (Auto)


  2.6 %


(0.0-3.0) 


 


 


Basophils (%) (Auto)


  1.4 %


(0.0-2.0) 


 


 


Sodium Level


  136 MMOL/L


(136-145) 


 


 


Potassium Level


  4.1 MMOL/L


(3.5-5.1) 


 


 


Chloride Level


  99 MMOL/L


() 


 


 


Carbon Dioxide Level


  31 MMOL/L


(21-32) 


 


 


Anion Gap


  6 mmol/L


(5-15) 


 


 


Blood Urea Nitrogen


  34 mg/dL


(7-18)  H 


 


 


Creatinine


  1.4 MG/DL


(0.55-1.30)  H 


 


 


Estimat Glomerular Filtration


Rate  mL/min (>60)  


  


 


 


Glucose Level


  116 MG/DL


()  H 


 


 


Calcium Level


  9.1 MG/DL


(8.5-10.1) 


 


 


Iron Level


  51 ug/dL


() 


 


 


Total Iron Binding Capacity


  102 ug/dL


(250-450)  L 


 


 


Percent Iron Saturation 50 % (15-50)   


 


Unsaturated Iron Binding


  51 ug/dL


(112-346)  L 


 


 


Stool Occult Blood  Pending  








Height (Feet):  5


Height (Inches):  2.00


Weight (Pounds):  124


Medications





Current Medications








 Medications


  (Trade)  Dose


 Ordered  Sig/Tony


 Route


 PRN Reason  Start Time


 Stop Time Status Last Admin


Dose Admin


 


 Acetaminophen


  (Tylenol)  650 mg  Q6H  PRN


 GT


 Mild Pain/Temp > 100.5  12/23/18 16:00


 1/20/19 15:59  1/10/19 08:49


 


 


 Acetaminophen/


 Hydrocodone Bitart


  (Norco 5/325)  1 tab  Q6H  PRN


 GT


 For Pain  1/15/19 16:45


 1/22/19 16:44  1/15/19 20:37


 


 


 Amiodarone HCl


  (Cordarone)  200 mg  DAILY


 GT


   1/2/19 09:00


 1/26/19 20:59  1/15/19 08:51


 


 


 Apixaban


  (Eliquis)  2.5 mg  BID


 GT


   12/27/18 18:00


 1/26/19 17:59  1/15/19 17:44


 


 


 Artificial Tears


  (Akwa-Tears)  1 drop  Q4H  PRN


 BOTH EYES


 Dry Eyes  12/23/18 15:30


 1/21/19 15:29  1/1/19 16:53


 


 


 Chlorhexidine


 Gluconate


  (Juana-Hex 2%)  1 applic  DAILY@2000


 TOPIC


   12/30/18 20:00


 1/29/19 19:59  1/15/19 19:40


 


 


 Collagenase


  (Santyl)  1 applic  DAILY


 TOPIC


   1/7/19 21:00


 2/6/19 08:59  1/15/19 09:20


 


 


 Epoetin Rupesh


  (Procrit (for


 ESRD on dialysis))  10,000 units  MON-WED-FRI


 SUBQ


   1/14/19 21:00


 2/13/19 20:59  1/14/19 20:34


 


 


 Iron Sucrose 100


 mg/Sodium Chloride  60 ml @ 


 240 mls/hr  DAILY


 IV


   1/15/19 09:00


 1/17/19 09:14  1/15/19 09:39


 


 


 Lansoprazole


  (Prevacid)  30 mg  Q12HR


 GT


   12/27/18 21:00


 1/26/19 20:59  1/15/19 20:34


 


 


 Lorazepam


  (Ativan)  0.5 mg  Q6H  PRN


 GT


 For Anxiety  1/15/19 15:45


 1/21/19 15:44  1/15/19 20:35


 


 


 Meropenem 500 mg/


 Sodium Chloride  55 ml @ 


 110 mls/hr  DAILY


 IVPB


   1/16/19 09:00


 1/17/19 23:59   


 


 


 Minoxidil


  (Loniten)  2.5 mg  Q12HR


 GT


   12/27/18 21:00


 1/26/19 20:59  1/15/19 20:34


 


 


 Sodium Chloride  1,000 ml @ 


 500 mls/hr  Q2H PRN


 IVLG


 sbp<90 during hd  1/15/19 16:43


 2/14/19 16:42   


 


 


 Sodium Chloride


  (Ocean Nasal


 Spray)  1 spray  Q4H  PRN


 NASAL


 dry nose  12/23/18 14:30


 1/21/19 18:29  1/12/19 08:35


 


 


 Zolpidem Tartrate


  (Ambien)  5 mg  HSPRN  PRN


 ORAL


 Insomnia  1/11/19 08:00


 1/18/19 07:59  1/14/19 04:12


 











Assessment/Plan


Problem List:  


(1) Dementia with behavioral disturbance


ICD Codes:  F03.91 - Unspecified dementia with behavioral disturbance


SNOMED:  8968253763624


(2) Encephalopathy chronic


ICD Codes:  G93.49 - Other encephalopathy


SNOMED:  90730886


Status:  stable, progressing


Assessment/Plan


cont Ativan


Seroquel standing 


provided ro/Cass Elizabeth MD Jan 16, 2019 00:00 Pt hourly rounding competed. Safety   Pt (*) resting on stretcher with side rails up and call bell in reach. () in chair    () in parents arms. Toileting   Pt offered ()Bedpan     (*)Assistance to Restroom     ()Urinal  Ongoing Updates  Updated on plan of care and status of test results.   Pain Management  Inquired as to comfort and offered comfort measures:    (*) warm blankets   (*) dimmed lights

## 2019-06-29 ENCOUNTER — APPOINTMENT (OUTPATIENT)
Dept: GENERAL RADIOLOGY | Age: 64
DRG: 309 | End: 2019-06-29
Attending: EMERGENCY MEDICINE
Payer: COMMERCIAL

## 2019-06-29 ENCOUNTER — HOSPITAL ENCOUNTER (EMERGENCY)
Dept: CT IMAGING | Age: 64
Discharge: HOME | DRG: 309 | End: 2019-06-29
Attending: EMERGENCY MEDICINE
Payer: COMMERCIAL

## 2019-06-29 ENCOUNTER — APPOINTMENT (OUTPATIENT)
Dept: CT IMAGING | Age: 64
DRG: 309 | End: 2019-06-29
Attending: EMERGENCY MEDICINE
Payer: COMMERCIAL

## 2019-06-29 ENCOUNTER — HOSPITAL ENCOUNTER (INPATIENT)
Age: 64
LOS: 2 days | Discharge: HOME | DRG: 309 | End: 2019-07-02
Attending: EMERGENCY MEDICINE | Admitting: INTERNAL MEDICINE
Payer: COMMERCIAL

## 2019-06-29 DIAGNOSIS — R06.02 SOB (SHORTNESS OF BREATH): ICD-10-CM

## 2019-06-29 DIAGNOSIS — R10.12 ABDOMINAL PAIN, LUQ (LEFT UPPER QUADRANT): ICD-10-CM

## 2019-06-29 DIAGNOSIS — I48.91 ATRIAL FIBRILLATION WITH RVR (HCC): Primary | ICD-10-CM

## 2019-06-29 LAB
ALBUMIN SERPL-MCNC: 3.8 G/DL (ref 3.4–5)
ALBUMIN/GLOB SERPL: 1.1 {RATIO} (ref 0.8–1.7)
ALP SERPL-CCNC: 46 U/L (ref 45–117)
ALT SERPL-CCNC: 17 U/L (ref 16–61)
ANION GAP SERPL CALC-SCNC: 10 MMOL/L (ref 3–18)
APPEARANCE UR: CLEAR
AST SERPL-CCNC: 21 U/L (ref 15–37)
BACTERIA URNS QL MICRO: ABNORMAL /HPF
BASOPHILS # BLD: 0 K/UL (ref 0–0.1)
BASOPHILS NFR BLD: 1 % (ref 0–2)
BILIRUB SERPL-MCNC: 2.1 MG/DL (ref 0.2–1)
BILIRUB UR QL: NEGATIVE
BNP SERPL-MCNC: 3587 PG/ML (ref 0–900)
BUN SERPL-MCNC: 13 MG/DL (ref 7–18)
BUN/CREAT SERPL: 9 (ref 12–20)
CALCIUM SERPL-MCNC: 8.9 MG/DL (ref 8.5–10.1)
CHLORIDE SERPL-SCNC: 109 MMOL/L (ref 100–108)
CK MB CFR SERPL CALC: ABNORMAL % (ref 0–4)
CK MB SERPL-MCNC: <1 NG/ML (ref 5–25)
CK SERPL-CCNC: 79 U/L (ref 39–308)
CO2 SERPL-SCNC: 25 MMOL/L (ref 21–32)
COLOR UR: ABNORMAL
CREAT SERPL-MCNC: 1.44 MG/DL (ref 0.6–1.3)
D DIMER PPP FEU-MCNC: 1.87 UG/ML(FEU)
DIFFERENTIAL METHOD BLD: ABNORMAL
EOSINOPHIL # BLD: 0 K/UL (ref 0–0.4)
EOSINOPHIL NFR BLD: 0 % (ref 0–5)
EPITH CASTS URNS QL MICRO: NEGATIVE /LPF (ref 0–5)
ERYTHROCYTE [DISTWIDTH] IN BLOOD BY AUTOMATED COUNT: 15.3 % (ref 11.6–14.5)
GLOBULIN SER CALC-MCNC: 3.6 G/DL (ref 2–4)
GLUCOSE SERPL-MCNC: 134 MG/DL (ref 74–99)
GLUCOSE UR STRIP.AUTO-MCNC: NEGATIVE MG/DL
HCT VFR BLD AUTO: 44.6 % (ref 36–48)
HGB BLD-MCNC: 14.7 G/DL (ref 13–16)
HGB UR QL STRIP: ABNORMAL
HYALINE CASTS URNS QL MICRO: ABNORMAL /LPF (ref 0–2)
KETONES UR QL STRIP.AUTO: ABNORMAL MG/DL
LEUKOCYTE ESTERASE UR QL STRIP.AUTO: NEGATIVE
LIPASE SERPL-CCNC: 41 U/L (ref 73–393)
LYMPHOCYTES # BLD: 1 K/UL (ref 0.9–3.6)
LYMPHOCYTES NFR BLD: 29 % (ref 21–52)
MAGNESIUM SERPL-MCNC: 1.8 MG/DL (ref 1.6–2.6)
MCH RBC QN AUTO: 31.1 PG (ref 24–34)
MCHC RBC AUTO-ENTMCNC: 33 G/DL (ref 31–37)
MCV RBC AUTO: 94.3 FL (ref 74–97)
MONOCYTES # BLD: 0.3 K/UL (ref 0.05–1.2)
MONOCYTES NFR BLD: 7 % (ref 3–10)
NEUTS SEG # BLD: 2.2 K/UL (ref 1.8–8)
NEUTS SEG NFR BLD: 63 % (ref 40–73)
NITRITE UR QL STRIP.AUTO: NEGATIVE
PH UR STRIP: 5 [PH] (ref 5–8)
PLATELET # BLD AUTO: 165 K/UL (ref 135–420)
PMV BLD AUTO: 10.3 FL (ref 9.2–11.8)
POTASSIUM SERPL-SCNC: 3.9 MMOL/L (ref 3.5–5.5)
PROT SERPL-MCNC: 7.4 G/DL (ref 6.4–8.2)
PROT UR STRIP-MCNC: 300 MG/DL
RBC # BLD AUTO: 4.73 M/UL (ref 4.7–5.5)
RBC #/AREA URNS HPF: ABNORMAL /HPF (ref 0–5)
SODIUM SERPL-SCNC: 144 MMOL/L (ref 136–145)
SP GR UR REFRACTOMETRY: 1.03 (ref 1–1.03)
TROPONIN I SERPL-MCNC: 0.51 NG/ML (ref 0–0.04)
UROBILINOGEN UR QL STRIP.AUTO: 1 EU/DL (ref 0.2–1)
WBC # BLD AUTO: 3.5 K/UL (ref 4.6–13.2)
WBC URNS QL MICRO: ABNORMAL /HPF (ref 0–5)

## 2019-06-29 PROCEDURE — 96376 TX/PRO/DX INJ SAME DRUG ADON: CPT

## 2019-06-29 PROCEDURE — 74011250636 HC RX REV CODE- 250/636: Performed by: EMERGENCY MEDICINE

## 2019-06-29 PROCEDURE — 74011636320 HC RX REV CODE- 636/320: Performed by: EMERGENCY MEDICINE

## 2019-06-29 PROCEDURE — 99285 EMERGENCY DEPT VISIT HI MDM: CPT

## 2019-06-29 PROCEDURE — 82550 ASSAY OF CK (CPK): CPT

## 2019-06-29 PROCEDURE — 84443 ASSAY THYROID STIM HORMONE: CPT

## 2019-06-29 PROCEDURE — 85025 COMPLETE CBC W/AUTO DIFF WBC: CPT

## 2019-06-29 PROCEDURE — 83880 ASSAY OF NATRIURETIC PEPTIDE: CPT

## 2019-06-29 PROCEDURE — 93005 ELECTROCARDIOGRAM TRACING: CPT

## 2019-06-29 PROCEDURE — 85379 FIBRIN DEGRADATION QUANT: CPT

## 2019-06-29 PROCEDURE — 83690 ASSAY OF LIPASE: CPT

## 2019-06-29 PROCEDURE — 96374 THER/PROPH/DIAG INJ IV PUSH: CPT

## 2019-06-29 PROCEDURE — 83735 ASSAY OF MAGNESIUM: CPT

## 2019-06-29 PROCEDURE — 81001 URINALYSIS AUTO W/SCOPE: CPT

## 2019-06-29 PROCEDURE — 96375 TX/PRO/DX INJ NEW DRUG ADDON: CPT

## 2019-06-29 PROCEDURE — 74011000250 HC RX REV CODE- 250: Performed by: EMERGENCY MEDICINE

## 2019-06-29 PROCEDURE — 71275 CT ANGIOGRAPHY CHEST: CPT

## 2019-06-29 PROCEDURE — 80053 COMPREHEN METABOLIC PANEL: CPT

## 2019-06-29 PROCEDURE — 74176 CT ABD & PELVIS W/O CONTRAST: CPT

## 2019-06-29 PROCEDURE — 74022 RADEX COMPL AQT ABD SERIES: CPT

## 2019-06-29 RX ORDER — DILTIAZEM HYDROCHLORIDE 5 MG/ML
10 INJECTION INTRAVENOUS
Status: COMPLETED | OUTPATIENT
Start: 2019-06-29 | End: 2019-06-29

## 2019-06-29 RX ORDER — FAMOTIDINE 10 MG/ML
20 INJECTION INTRAVENOUS
Status: COMPLETED | OUTPATIENT
Start: 2019-06-29 | End: 2019-06-29

## 2019-06-29 RX ORDER — MORPHINE SULFATE 4 MG/ML
4 INJECTION INTRAVENOUS
Status: COMPLETED | OUTPATIENT
Start: 2019-06-29 | End: 2019-06-29

## 2019-06-29 RX ORDER — MORPHINE SULFATE 4 MG/ML
4 INJECTION INTRAVENOUS
Status: DISPENSED | OUTPATIENT
Start: 2019-06-29 | End: 2019-06-30

## 2019-06-29 RX ORDER — SPIRONOLACTONE 25 MG/1
TABLET ORAL DAILY
COMMUNITY

## 2019-06-29 RX ADMIN — IOHEXOL: 240 INJECTION, SOLUTION INTRATHECAL; INTRAVASCULAR; INTRAVENOUS; ORAL at 19:32

## 2019-06-29 RX ADMIN — MORPHINE SULFATE 4 MG: 4 INJECTION INTRAVENOUS at 17:17

## 2019-06-29 RX ADMIN — FAMOTIDINE 20 MG: 10 INJECTION, SOLUTION INTRAVENOUS at 17:15

## 2019-06-29 RX ADMIN — DILTIAZEM HYDROCHLORIDE 10 MG: 5 INJECTION INTRAVENOUS at 19:58

## 2019-06-29 RX ADMIN — DILTIAZEM HYDROCHLORIDE 10 MG: 5 INJECTION INTRAVENOUS at 18:32

## 2019-06-29 RX ADMIN — IOPAMIDOL 100 ML: 755 INJECTION, SOLUTION INTRAVENOUS at 18:47
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
## 2019-06-30 ENCOUNTER — HOSPITAL ENCOUNTER (INPATIENT)
Dept: ULTRASOUND IMAGING | Age: 64
Discharge: HOME | DRG: 309 | End: 2019-06-30
Attending: INTERNAL MEDICINE
Payer: COMMERCIAL

## 2019-06-30 PROBLEM — I48.91 ATRIAL FIBRILLATION (HCC): Status: ACTIVE | Noted: 2019-06-30

## 2019-06-30 PROBLEM — I48.91 NEW ONSET A-FIB (HCC): Status: ACTIVE | Noted: 2019-06-30

## 2019-06-30 LAB
ALBUMIN SERPL-MCNC: 3.9 G/DL (ref 3.4–5)
ALBUMIN/GLOB SERPL: 1.1 {RATIO} (ref 0.8–1.7)
ALP SERPL-CCNC: 43 U/L (ref 45–117)
ALT SERPL-CCNC: 16 U/L (ref 16–61)
ANION GAP SERPL CALC-SCNC: 11 MMOL/L (ref 3–18)
APTT PPP: 126.2 SEC (ref 23–36.4)
APTT PPP: >180 SEC (ref 23–36.4)
AST SERPL-CCNC: 20 U/L (ref 15–37)
ATRIAL RATE: 129 BPM
BASOPHILS # BLD: 0 K/UL (ref 0–0.1)
BASOPHILS NFR BLD: 0 % (ref 0–2)
BILIRUB SERPL-MCNC: 1.9 MG/DL (ref 0.2–1)
BUN SERPL-MCNC: 14 MG/DL (ref 7–18)
BUN/CREAT SERPL: 12 (ref 12–20)
CALCIUM SERPL-MCNC: 8.8 MG/DL (ref 8.5–10.1)
CALCULATED R AXIS, ECG10: -19 DEGREES
CALCULATED T AXIS, ECG11: 89 DEGREES
CHLORIDE SERPL-SCNC: 108 MMOL/L (ref 100–108)
CK MB CFR SERPL CALC: 1.3 % (ref 0–4)
CK MB SERPL-MCNC: 1.1 NG/ML (ref 5–25)
CK SERPL-CCNC: 85 U/L (ref 39–308)
CO2 SERPL-SCNC: 23 MMOL/L (ref 21–32)
CREAT SERPL-MCNC: 1.18 MG/DL (ref 0.6–1.3)
DIAGNOSIS, 93000: NORMAL
DIFFERENTIAL METHOD BLD: ABNORMAL
EOSINOPHIL # BLD: 0 K/UL (ref 0–0.4)
EOSINOPHIL NFR BLD: 0 % (ref 0–5)
ERYTHROCYTE [DISTWIDTH] IN BLOOD BY AUTOMATED COUNT: 15.5 % (ref 11.6–14.5)
EST. AVERAGE GLUCOSE BLD GHB EST-MCNC: 126 MG/DL
GLOBULIN SER CALC-MCNC: 3.4 G/DL (ref 2–4)
GLUCOSE BLD STRIP.AUTO-MCNC: 107 MG/DL (ref 70–110)
GLUCOSE BLD STRIP.AUTO-MCNC: 109 MG/DL (ref 70–110)
GLUCOSE BLD STRIP.AUTO-MCNC: 122 MG/DL (ref 70–110)
GLUCOSE BLD STRIP.AUTO-MCNC: 99 MG/DL (ref 70–110)
GLUCOSE SERPL-MCNC: 93 MG/DL (ref 74–99)
HBA1C MFR BLD: 6 % (ref 4.2–5.6)
HCT VFR BLD AUTO: 43.6 % (ref 36–48)
HGB BLD-MCNC: 14.1 G/DL (ref 13–16)
LYMPHOCYTES # BLD: 1.7 K/UL (ref 0.9–3.6)
LYMPHOCYTES NFR BLD: 33 % (ref 21–52)
MAGNESIUM SERPL-MCNC: 2 MG/DL (ref 1.6–2.6)
MCH RBC QN AUTO: 30.9 PG (ref 24–34)
MCHC RBC AUTO-ENTMCNC: 32.3 G/DL (ref 31–37)
MCV RBC AUTO: 95.6 FL (ref 74–97)
MONOCYTES # BLD: 0.5 K/UL (ref 0.05–1.2)
MONOCYTES NFR BLD: 10 % (ref 3–10)
NEUTS SEG # BLD: 2.9 K/UL (ref 1.8–8)
NEUTS SEG NFR BLD: 57 % (ref 40–73)
PHOSPHATE SERPL-MCNC: 2.9 MG/DL (ref 2.5–4.9)
PLATELET # BLD AUTO: 167 K/UL (ref 135–420)
PMV BLD AUTO: 10.9 FL (ref 9.2–11.8)
POTASSIUM SERPL-SCNC: 4 MMOL/L (ref 3.5–5.5)
PROT SERPL-MCNC: 7.3 G/DL (ref 6.4–8.2)
Q-T INTERVAL, ECG07: 346 MS
QRS DURATION, ECG06: 102 MS
QTC CALCULATION (BEZET), ECG08: 506 MS
RBC # BLD AUTO: 4.56 M/UL (ref 4.7–5.5)
SODIUM SERPL-SCNC: 142 MMOL/L (ref 136–145)
TROPONIN I SERPL-MCNC: 0.52 NG/ML (ref 0–0.04)
TSH SERPL DL<=0.05 MIU/L-ACNC: 2.94 UIU/ML (ref 0.36–3.74)
TSH SERPL DL<=0.05 MIU/L-ACNC: 4.08 UIU/ML (ref 0.36–3.74)
VENTRICULAR RATE, ECG03: 129 BPM
WBC # BLD AUTO: 5.1 K/UL (ref 4.6–13.2)

## 2019-06-30 PROCEDURE — 84100 ASSAY OF PHOSPHORUS: CPT

## 2019-06-30 PROCEDURE — 76705 ECHO EXAM OF ABDOMEN: CPT

## 2019-06-30 PROCEDURE — 74011250636 HC RX REV CODE- 250/636: Performed by: INTERNAL MEDICINE

## 2019-06-30 PROCEDURE — 74011000258 HC RX REV CODE- 258: Performed by: INTERNAL MEDICINE

## 2019-06-30 PROCEDURE — 82962 GLUCOSE BLOOD TEST: CPT

## 2019-06-30 PROCEDURE — 85730 THROMBOPLASTIN TIME PARTIAL: CPT

## 2019-06-30 PROCEDURE — 80053 COMPREHEN METABOLIC PANEL: CPT

## 2019-06-30 PROCEDURE — 82550 ASSAY OF CK (CPK): CPT

## 2019-06-30 PROCEDURE — 65660000000 HC RM CCU STEPDOWN

## 2019-06-30 PROCEDURE — 83735 ASSAY OF MAGNESIUM: CPT

## 2019-06-30 PROCEDURE — 36415 COLL VENOUS BLD VENIPUNCTURE: CPT

## 2019-06-30 PROCEDURE — 84443 ASSAY THYROID STIM HORMONE: CPT

## 2019-06-30 PROCEDURE — 74011250637 HC RX REV CODE- 250/637: Performed by: INTERNAL MEDICINE

## 2019-06-30 PROCEDURE — 83036 HEMOGLOBIN GLYCOSYLATED A1C: CPT

## 2019-06-30 PROCEDURE — 74011000250 HC RX REV CODE- 250: Performed by: INTERNAL MEDICINE

## 2019-06-30 PROCEDURE — 85025 COMPLETE CBC W/AUTO DIFF WBC: CPT

## 2019-06-30 RX ORDER — DILTIAZEM HYDROCHLORIDE 180 MG/1
180 CAPSULE, COATED, EXTENDED RELEASE ORAL DAILY
Status: DISCONTINUED | OUTPATIENT
Start: 2019-06-30 | End: 2019-07-02 | Stop reason: HOSPADM

## 2019-06-30 RX ORDER — ASPIRIN 81 MG/1
81 TABLET ORAL DAILY
Status: DISCONTINUED | OUTPATIENT
Start: 2019-07-01 | End: 2019-07-02 | Stop reason: HOSPADM

## 2019-06-30 RX ORDER — MAGNESIUM SULFATE 100 %
4 CRYSTALS MISCELLANEOUS AS NEEDED
Status: DISCONTINUED | OUTPATIENT
Start: 2019-06-30 | End: 2019-07-02 | Stop reason: HOSPADM

## 2019-06-30 RX ORDER — ACETAMINOPHEN 325 MG/1
650 TABLET ORAL
Status: DISCONTINUED | OUTPATIENT
Start: 2019-06-30 | End: 2019-07-02 | Stop reason: HOSPADM

## 2019-06-30 RX ORDER — METOPROLOL SUCCINATE 50 MG/1
50 TABLET, EXTENDED RELEASE ORAL DAILY
Status: DISCONTINUED | OUTPATIENT
Start: 2019-07-01 | End: 2019-07-02

## 2019-06-30 RX ORDER — HEPARIN SODIUM 10000 [USP'U]/100ML
18-36 INJECTION, SOLUTION INTRAVENOUS
Status: DISCONTINUED | OUTPATIENT
Start: 2019-06-30 | End: 2019-07-02

## 2019-06-30 RX ORDER — SODIUM CHLORIDE 9 MG/ML
50 INJECTION, SOLUTION INTRAVENOUS CONTINUOUS
Status: DISCONTINUED | OUTPATIENT
Start: 2019-06-30 | End: 2019-06-30

## 2019-06-30 RX ORDER — PRAVASTATIN SODIUM 20 MG/1
20 TABLET ORAL
Status: DISCONTINUED | OUTPATIENT
Start: 2019-06-30 | End: 2019-07-02 | Stop reason: HOSPADM

## 2019-06-30 RX ORDER — HEPARIN SODIUM 1000 [USP'U]/ML
80 INJECTION, SOLUTION INTRAVENOUS; SUBCUTANEOUS ONCE
Status: COMPLETED | OUTPATIENT
Start: 2019-06-30 | End: 2019-06-30

## 2019-06-30 RX ORDER — SIMETHICONE 80 MG
80 TABLET,CHEWABLE ORAL
Status: DISCONTINUED | OUTPATIENT
Start: 2019-06-30 | End: 2019-07-02 | Stop reason: HOSPADM

## 2019-06-30 RX ORDER — AMLODIPINE BESYLATE 5 MG/1
10 TABLET ORAL DAILY
Status: DISCONTINUED | OUTPATIENT
Start: 2019-06-30 | End: 2019-07-01

## 2019-06-30 RX ORDER — ISOSORBIDE DINITRATE AND HYDRALAZINE HYDROCHLORIDE 37.5; 2 MG/1; MG/1
1 TABLET ORAL 3 TIMES DAILY
Status: DISCONTINUED | OUTPATIENT
Start: 2019-06-30 | End: 2019-07-02 | Stop reason: HOSPADM

## 2019-06-30 RX ORDER — HYDRALAZINE HYDROCHLORIDE 20 MG/ML
10 INJECTION INTRAMUSCULAR; INTRAVENOUS
Status: DISCONTINUED | OUTPATIENT
Start: 2019-06-30 | End: 2019-07-02 | Stop reason: HOSPADM

## 2019-06-30 RX ORDER — FAMOTIDINE 20 MG/1
20 TABLET, FILM COATED ORAL 2 TIMES DAILY
Status: DISCONTINUED | OUTPATIENT
Start: 2019-06-30 | End: 2019-07-02 | Stop reason: HOSPADM

## 2019-06-30 RX ORDER — LABETALOL HCL 20 MG/4 ML
10 SYRINGE (ML) INTRAVENOUS
Status: DISCONTINUED | OUTPATIENT
Start: 2019-06-30 | End: 2019-07-02 | Stop reason: HOSPADM

## 2019-06-30 RX ORDER — INSULIN LISPRO 100 [IU]/ML
INJECTION, SOLUTION INTRAVENOUS; SUBCUTANEOUS
Status: DISCONTINUED | OUTPATIENT
Start: 2019-06-30 | End: 2019-07-02 | Stop reason: HOSPADM

## 2019-06-30 RX ORDER — ONDANSETRON 2 MG/ML
4 INJECTION INTRAMUSCULAR; INTRAVENOUS
Status: DISCONTINUED | OUTPATIENT
Start: 2019-06-30 | End: 2019-07-02 | Stop reason: HOSPADM

## 2019-06-30 RX ORDER — SPIRONOLACTONE 25 MG/1
25 TABLET ORAL DAILY
Status: DISCONTINUED | OUTPATIENT
Start: 2019-07-01 | End: 2019-07-02 | Stop reason: HOSPADM

## 2019-06-30 RX ORDER — SIMETHICONE 80 MG
80 TABLET,CHEWABLE ORAL
Status: COMPLETED | OUTPATIENT
Start: 2019-06-30 | End: 2019-06-30

## 2019-06-30 RX ORDER — ISOSORBIDE DINITRATE AND HYDRALAZINE HYDROCHLORIDE 37.5; 2 MG/1; MG/1
1 TABLET ORAL 3 TIMES DAILY
Status: DISCONTINUED | OUTPATIENT
Start: 2019-06-30 | End: 2019-06-30 | Stop reason: SDUPTHER

## 2019-06-30 RX ADMIN — DILTIAZEM HYDROCHLORIDE 5 MG/HR: 5 INJECTION INTRAVENOUS at 02:11

## 2019-06-30 RX ADMIN — HYDRALAZINE HYDROCHLORIDE AND ISOSORBIDE DINITRATE 1 TABLET: 37.5; 2 TABLET, FILM COATED ORAL at 22:26

## 2019-06-30 RX ADMIN — AMLODIPINE BESYLATE 10 MG: 5 TABLET ORAL at 10:52

## 2019-06-30 RX ADMIN — HEPARIN SODIUM 18 UNITS/KG/HR: 10000 INJECTION, SOLUTION INTRAVENOUS at 05:13

## 2019-06-30 RX ADMIN — PRAVASTATIN SODIUM 20 MG: 20 TABLET ORAL at 22:26

## 2019-06-30 RX ADMIN — SODIUM CHLORIDE 50 ML/HR: 900 INJECTION, SOLUTION INTRAVENOUS at 00:16

## 2019-06-30 RX ADMIN — FAMOTIDINE 20 MG: 20 TABLET ORAL at 10:52

## 2019-06-30 RX ADMIN — HEPARIN SODIUM 7180 UNITS: 1000 INJECTION, SOLUTION INTRAVENOUS; SUBCUTANEOUS at 00:17

## 2019-06-30 RX ADMIN — SIMETHICONE CHEW TAB 80 MG 80 MG: 80 TABLET ORAL at 16:49

## 2019-06-30 RX ADMIN — HEPARIN SODIUM 13 UNITS/KG/HR: 10000 INJECTION, SOLUTION INTRAVENOUS at 23:19

## 2019-06-30 RX ADMIN — SIMETHICONE CHEW TAB 80 MG 80 MG: 80 TABLET ORAL at 07:09

## 2019-06-30 RX ADMIN — DILTIAZEM HYDROCHLORIDE 180 MG: 180 CAPSULE, COATED, EXTENDED RELEASE ORAL at 10:52

## 2019-06-30 RX ADMIN — HEPARIN SODIUM 13 UNITS/KG/HR: 10000 INJECTION, SOLUTION INTRAVENOUS at 22:27

## 2019-06-30 RX ADMIN — FAMOTIDINE 20 MG: 20 TABLET ORAL at 22:26

## 2019-06-30 NOTE — PROGRESS NOTES
Bedside and Verbal shift change report given to ANGELA Matos (oncoming nurse) by Danielle (offgoing nurse). Report included the following information SBAR, Kardex, Intake/Output, MAR and Recent Results.

## 2019-06-30 NOTE — ROUTINE PROCESS
8151 assumed care of pt after bedside verbal report was given by off going nurse, pt resting in bed awake, heparin gtt infusing at 18 units/kg/hr, cardizem gtt infusing at 5 mg/hr, no acute distress noted, pt denies c/o pain or shortness of breath, call bell with in pt's reach, will monitor     1124 inquired with Dr. Paul Diamond if pt's cardizem gtt needed to be discontinued after pt given cardizem pill, ordered to stop 1 hour after cardizem pill was given    1146 stopped cardizem gtt as per order    1309 pt sitting up on the side of the bed with family at bedside, no acute distress noted    1618 Bedside and Verbal shift change report given to [facility] (oncoming nurse) by Alisa MCGOWAN (offgoing nurse). Report included the following information SBAR, Kardex and MAR.

## 2019-06-30 NOTE — PROGRESS NOTES
Chart reviewed as CM on call. Pt admitted for new onset A fib, CHF. Pt could benefit from [facility] at minimum. CM will follow for transition of care needs. Reason for Admission:   Per H&P, pt is \"is a 62 y.o. male with past medical history significant for Chronic CHF, pulmonary hypertension, Dm2 & HTn presents to the ER with 3-4 days abdominal pain located in RUQ & LUQ, \" sharp\", no aleviating or aggravating factors, assocaited w constipation. He reports he took a laxative w some effect. He denies chest pain, pressure, palpitations, dyspnea or vomiting.     On prsentaiton to the ER he was afebrile, in a fib w RVR, hypertensive. He wsa in afib w clear lung fields. Abdomen w minimal distention. EKG w AFib, labs w mild hypokalemia, tropnin up to 1.9 w normal indices. BNp elevated. He had CTA of chest which was negative for PE, pulmonary HTN w TR and mild pericardial effusion. CT of abdomen w trace ascites and possible pericholecystic fluid. Medicine is asked to admit for further management. \"      RRAT Score:   9 low                  Plan for utilizing home health:     H2H?                      Current Advanced Directive/Advance Care Plan: Not on file                         Transition of Care Plan:    TBD                    Care Management Interventions  Transition of Care Consult (CM Consult): Discharge Planning
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
## 2019-06-30 NOTE — PROGRESS NOTES
[facility] care of patient from 46 [facility]. 1649 Pt refusing Bidil 20-37.5 mg scheduled. Systolic blood pressure currently in 170's. Pt educated on mechanism of action , verbalized understanding. 46 SBAR reviewed with Dr Angus De La Rosa, pt refusing Bidil blood pressure medication. Prn hydralazine ordered for systolic greater than 977.
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
## 2019-06-30 NOTE — ED NOTES
TRANSFER - OUT REPORT:    Verbal report given to ROSLYN Carroll(name) on NEREIDA Chung  being transferred to Tele(unit) for routine progression of care       Report consisted of patients Situation, Background, Assessment and   Recommendations(SBAR). Information from the following report(s) ED Summary was reviewed with the receiving nurse. Lines:   Peripheral IV 06/29/19 Right Antecubital (Active)   Site Assessment Clean, dry, & intact 6/29/2019  6:36 PM   Phlebitis Assessment 0 6/29/2019  6:36 PM   Infiltration Assessment 4 6/29/2019  6:36 PM   Dressing Status Clean, dry, & intact 6/29/2019  6:36 PM   Hub Color/Line Status Pink 6/29/2019  6:36 PM        Opportunity for questions and clarification was provided.       Patient transported with:   Monitor  Registered Nurse

## 2019-06-30 NOTE — PROGRESS NOTES
Hospitalist Progress Note    Patient: Tre David MRN: [id number]  CSN: [id number]    YOB: 1955  Age: 62 y.o. Sex: male    DOA: 6/29/2019 LOS:  LOS: 0 days            Assessment/Plan   1. New onset atrial fibrillation w rapid ventricular response  2. Chronic LV systolic& diastolic CHF, compensated and on dry side currently  3. Pulmonary hypertension  4. DM2  5. Dehydration  6. abdominal pain ? Constipation, cholecystitis (doubt)  7. Chronically elevated troponin  8. Renal insufficiency        Plan:  - stop fluids  - start po cardizem and wean off drip  - awaiting Am labs  - cardiology consult today  - simethicone prn  - RUQ us pending      Patient Active Problem List   Diagnosis Code    Hypertension I10    CHF (congestive heart failure) ([facility] I50.9    HAYS (dyspnea on exertion) R06.09    NSTEMI (non-ST elevated myocardial infarction) ([facility] I21.4    Hypertensive emergency I16.1    DM (diabetes mellitus) ([facility] E11.9    Atrial fibrillation ([facility] I48.91    New onset a-fib ([facility] I48.91               Subjective:    cc: \" I feel good\"  Pt complains of \" gas\"  Abdominal pain, discomfort significantly improving      REVIEW OF SYSTEMS:  General: No fevers or chills. Cardiovascular: No chest pain or pressure. No palpitations. Pulmonary: No shortness of breath. Gastrointestinal: No nausea, vomiting. Objective:        Vital signs/Intake and Output:  Visit Vitals  /86   Pulse 92   Temp 97.8 °F (36.6 °C)   Resp (!) 33   Ht 5' 6\" (1.676 m)   Wt 89.8 kg (198 lb)   SpO2 100%   BMI 31.96 kg/m²     Current Shift:  No intake/output data recorded. Last three shifts:  No intake/output data recorded. Body mass index is 31.96 kg/m².     Physical Exam:  GEN: Alert and oriented times three NAD  EYES: conjunctiva normal, lids with out lesions  HEENT: MMM, No thyromegaly, no lymphadenopathy  HEART:  +S1 +S2, no JVD, pulses 2+ distally  LUNGS: CTA B/L no wheezes, rales or rhonchi  ABDOMEN: + BS, soft NT/ND no organomegaly,  No rebound  EXTREMITIES: No edema cyanosis, cap refill normal   SKIN: no rashes or skin breakdown, no nodules, normal turgor  Current Facility-Administered Medications   Medication Dose Route Frequency    0.9% sodium chloride infusion  50 mL/hr IntraVENous CONTINUOUS    heparin 25,000 units in D5W 250 ml infusion  18-36 Units/kg/hr IntraVENous TITRATE    famotidine (PEPCID) tablet 20 mg  20 mg Oral BID    acetaminophen (TYLENOL) tablet 650 mg  650 mg Oral Q4H PRN    ondansetron (ZOFRAN) injection 4 mg  4 mg IntraVENous Q4H PRN    insulin lispro (HUMALOG) injection   SubCUTAneous AC&HS    glucose chewable tablet 16 g  4 Tab Oral PRN    glucagon (GLUCAGEN) injection 1 mg  1 mg IntraMUSCular PRN    dilTIAZem (CARDIZEM) 125 mg in 0.9% sodium chloride 125 mL infusion  5 mg/hr IntraVENous TITRATE    labetalol (NORMODYNE;TRANDATE) 20 mg/4 mL (5 mg/mL) injection 10 mg  10 mg IntraVENous Q4H PRN    amLODIPine (NORVASC) tablet 10 mg  10 mg Oral DAILY    simethicone (MYLICON) tablet 80 mg  80 mg Oral QID PRN    simethicone (MYLICON) tablet 80 mg  80 mg Oral NOW    morphine injection 4 mg  4 mg IntraVENous NOW         All the patient's labs over the past 24 hours were reviewed both during my initial daily workflow process and at the time notated as \"note time\" in [address] [facility]. (It is not time stamped separately in this workflow.)  Select labs are listed below.         Labs: Results:       Chemistry Recent Labs     06/29/19  1700   *      K 3.9   *   CO2 25   BUN 13   CREA 1.44*   CA 8.9   AGAP 10   BUCR 9*   AP 46   TP 7.4   ALB 3.8   GLOB 3.6   AGRAT 1.1      CBC w/Diff Recent Labs     06/29/19  1700   WBC 3.5*   RBC 4.73   HGB 14.7   HCT 44.6      GRANS 63   LYMPH 29   EOS 0      Cardiac Enzymes Recent Labs     06/29/19  1700   CPK 79   CKND1 CALCULATION NOT PERFORMED WHEN RESULT IS BELOW LINEAR LIMIT          Lipid Panel Lab Results Component Value Date/Time    Cholesterol, total 126 10/02/2013 04:38 AM    HDL Cholesterol 33 (L) 10/02/2013 04:38 AM    LDL, calculated 82 10/02/2013 04:38 AM    VLDL, calculated 11 10/02/2013 04:38 AM    Triglyceride 55 10/02/2013 04:38 AM    CHOL/HDL Ratio 3.8 10/02/2013 04:38 AM          Liver Enzymes Recent Labs     06/29/19  1700   TP 7.4   ALB 3.8   AP 46   SGOT 21      Thyroid Studies Lab Results   Component Value Date/Time    TSH 4.84 (H) 04/24/2018 10:00 PM        Procedures/imaging: see electronic medical records for all procedures/Xrays and details which were not copied into this note but were reviewed prior to creation of Luis Felipe Sorto DO  Internal Medicine/Geriatrics

## 2019-07-01 ENCOUNTER — APPOINTMENT (OUTPATIENT)
Dept: NON INVASIVE DIAGNOSTICS | Age: 64
DRG: 309 | End: 2019-07-01
Attending: INTERNAL MEDICINE
Payer: COMMERCIAL

## 2019-07-01 LAB
ALBUMIN SERPL-MCNC: 3.6 G/DL (ref 3.4–5)
ALBUMIN/GLOB SERPL: 1.1 {RATIO} (ref 0.8–1.7)
ALP SERPL-CCNC: 47 U/L (ref 45–117)
ALT SERPL-CCNC: 18 U/L (ref 16–61)
ANION GAP SERPL CALC-SCNC: 8 MMOL/L (ref 3–18)
APTT PPP: 124.7 SEC (ref 23–36.4)
APTT PPP: 89.6 SEC (ref 23–36.4)
AST SERPL-CCNC: 18 U/L (ref 15–37)
BASOPHILS # BLD: 0 K/UL (ref 0–0.1)
BASOPHILS NFR BLD: 0 % (ref 0–2)
BILIRUB SERPL-MCNC: 2 MG/DL (ref 0.2–1)
BUN SERPL-MCNC: 14 MG/DL (ref 7–18)
BUN/CREAT SERPL: 13 (ref 12–20)
CALCIUM SERPL-MCNC: 8.8 MG/DL (ref 8.5–10.1)
CHLORIDE SERPL-SCNC: 106 MMOL/L (ref 100–108)
CO2 SERPL-SCNC: 27 MMOL/L (ref 21–32)
CREAT SERPL-MCNC: 1.05 MG/DL (ref 0.6–1.3)
DIFFERENTIAL METHOD BLD: ABNORMAL
ECHO AO ASC DIAM: 3.87 CM
ECHO AO ROOT DIAM: 3.99 CM
ECHO AV AREA PEAK VELOCITY: 2.5 CM2
ECHO AV AREA VTI: 2.7 CM2
ECHO AV AREA/BSA PEAK VELOCITY: 1.2 CM2/M2
ECHO AV AREA/BSA VTI: 1.3 CM2/M2
ECHO AV MEAN GRADIENT: 4.8 MMHG
ECHO AV PEAK GRADIENT: 8 MMHG
ECHO AV PEAK VELOCITY: 141.78 CM/S
ECHO AV REGURGITANT PHT: 518.9 CM
ECHO AV VTI: 23.19 CM
ECHO IVC PROX: 2.32 CM
ECHO LA MAJOR AXIS: 5.18 CM
ECHO LA VOL 2C: 70.29 ML (ref 18–58)
ECHO LA VOL 4C: 65.22 ML (ref 18–58)
ECHO LA VOL BP: 72.87 ML (ref 18–58)
ECHO LA VOL/BSA BIPLANE: 36.59 ML/M2 (ref 16–28)
ECHO LA VOLUME INDEX A2C: 35.29 ML/M2 (ref 16–28)
ECHO LA VOLUME INDEX A4C: 32.75 ML/M2 (ref 16–28)
ECHO LV E' LATERAL VELOCITY: 7 CM/S
ECHO LV E' SEPTAL VELOCITY: 8 CM/S
ECHO LV EDV A2C: 140.1 ML
ECHO LV EDV A4C: 118.8 ML
ECHO LV EDV BP: 139.1 ML (ref 67–155)
ECHO LV EDV INDEX A4C: 59.7 ML/M2
ECHO LV EDV INDEX BP: 69.8 ML/M2
ECHO LV EDV NDEX A2C: 70.3 ML/M2
ECHO LV EJECTION FRACTION A2C: 31 %
ECHO LV EJECTION FRACTION A4C: 57 %
ECHO LV EJECTION FRACTION BIPLANE: 44.7 % (ref 55–100)
ECHO LV ESV A2C: 96.8 ML
ECHO LV ESV A4C: 51.5 ML
ECHO LV ESV BP: 76.9 ML (ref 22–58)
ECHO LV ESV INDEX A2C: 48.6 ML/M2
ECHO LV ESV INDEX A4C: 25.9 ML/M2
ECHO LV ESV INDEX BP: 38.6 ML/M2
ECHO LV INTERNAL DIMENSION DIASTOLIC: 4.3 CM (ref 4.2–5.9)
ECHO LV INTERNAL DIMENSION SYSTOLIC: 3.34 CM
ECHO LV IVSD: 2.07 CM (ref 0.6–1)
ECHO LV MASS 2D: 537.8 G (ref 88–224)
ECHO LV MASS INDEX 2D: 270 G/M2 (ref 49–115)
ECHO LV POSTERIOR WALL DIASTOLIC: 2.11 CM (ref 0.6–1)
ECHO LVOT DIAM: 2.05 CM
ECHO LVOT PEAK GRADIENT: 4.7 MMHG
ECHO LVOT PEAK VELOCITY: 108.2 CM/S
ECHO LVOT VTI: 19.29 CM
ECHO MV A VELOCITY: 0.1 CM/S
ECHO MV AREA PHT: 4.1 CM2
ECHO MV E DECELERATION TIME (DT): 183.6 MS
ECHO MV E VELOCITY: 81.53 CM/S
ECHO MV E/A RATIO: 815.3
ECHO MV E/E' LATERAL: 11.65
ECHO MV E/E' RATIO (AVERAGED): 10.92
ECHO MV E/E' SEPTAL: 10.19
ECHO MV PRESSURE HALF TIME (PHT): 53.2 MS
ECHO PULMONARY ARTERY SYSTOLIC PRESSURE (PASP): 55 MMHG
ECHO RA AREA 4C: 31.53 CM2
ECHO RV INTERNAL DIMENSION: 4.66 CM
ECHO TRICUSPID ANNULAR PEAK SYSTOLIC VELOCITY: 1.1 CM/S
ECHO TV REGURGITANT MAX VELOCITY: 315.51 CM/S
ECHO TV REGURGITANT PEAK GRADIENT: 39.8 MMHG
EOSINOPHIL # BLD: 0.1 K/UL (ref 0–0.4)
EOSINOPHIL NFR BLD: 2 % (ref 0–5)
ERYTHROCYTE [DISTWIDTH] IN BLOOD BY AUTOMATED COUNT: 15.6 % (ref 11.6–14.5)
GLOBULIN SER CALC-MCNC: 3.2 G/DL (ref 2–4)
GLUCOSE BLD STRIP.AUTO-MCNC: 102 MG/DL (ref 70–110)
GLUCOSE BLD STRIP.AUTO-MCNC: 110 MG/DL (ref 70–110)
GLUCOSE BLD STRIP.AUTO-MCNC: 123 MG/DL (ref 70–110)
GLUCOSE BLD STRIP.AUTO-MCNC: 97 MG/DL (ref 70–110)
GLUCOSE SERPL-MCNC: 109 MG/DL (ref 74–99)
HCT VFR BLD AUTO: 43.2 % (ref 36–48)
HGB BLD-MCNC: 13.7 G/DL (ref 13–16)
INR PPP: 1.3 (ref 0.8–1.2)
LYMPHOCYTES # BLD: 1.5 K/UL (ref 0.9–3.6)
LYMPHOCYTES NFR BLD: 30 % (ref 21–52)
MAGNESIUM SERPL-MCNC: 1.7 MG/DL (ref 1.6–2.6)
MCH RBC QN AUTO: 30.3 PG (ref 24–34)
MCHC RBC AUTO-ENTMCNC: 31.7 G/DL (ref 31–37)
MCV RBC AUTO: 95.6 FL (ref 74–97)
MONOCYTES # BLD: 0.5 K/UL (ref 0.05–1.2)
MONOCYTES NFR BLD: 9 % (ref 3–10)
NEUTS SEG # BLD: 3 K/UL (ref 1.8–8)
NEUTS SEG NFR BLD: 59 % (ref 40–73)
PISA AR MAX VEL: 448.79 CM/S
PLATELET # BLD AUTO: 167 K/UL (ref 135–420)
PMV BLD AUTO: 10.8 FL (ref 9.2–11.8)
POTASSIUM SERPL-SCNC: 3.4 MMOL/L (ref 3.5–5.5)
POTASSIUM SERPL-SCNC: 4.1 MMOL/L (ref 3.5–5.5)
PROT SERPL-MCNC: 6.8 G/DL (ref 6.4–8.2)
PROTHROMBIN TIME: 15.7 SEC (ref 11.5–15.2)
RBC # BLD AUTO: 4.52 M/UL (ref 4.7–5.5)
SODIUM SERPL-SCNC: 141 MMOL/L (ref 136–145)
WBC # BLD AUTO: 5.1 K/UL (ref 4.6–13.2)

## 2019-07-01 PROCEDURE — 85730 THROMBOPLASTIN TIME PARTIAL: CPT

## 2019-07-01 PROCEDURE — 74011250637 HC RX REV CODE- 250/637: Performed by: INTERNAL MEDICINE

## 2019-07-01 PROCEDURE — 84132 ASSAY OF SERUM POTASSIUM: CPT

## 2019-07-01 PROCEDURE — 82962 GLUCOSE BLOOD TEST: CPT

## 2019-07-01 PROCEDURE — 74011250636 HC RX REV CODE- 250/636: Performed by: INTERNAL MEDICINE

## 2019-07-01 PROCEDURE — 74011250637 HC RX REV CODE- 250/637: Performed by: PHYSICIAN ASSISTANT

## 2019-07-01 PROCEDURE — 83735 ASSAY OF MAGNESIUM: CPT

## 2019-07-01 PROCEDURE — 93306 TTE W/DOPPLER COMPLETE: CPT

## 2019-07-01 PROCEDURE — 65660000000 HC RM CCU STEPDOWN

## 2019-07-01 PROCEDURE — 85025 COMPLETE CBC W/AUTO DIFF WBC: CPT

## 2019-07-01 PROCEDURE — 85610 PROTHROMBIN TIME: CPT

## 2019-07-01 PROCEDURE — 36415 COLL VENOUS BLD VENIPUNCTURE: CPT

## 2019-07-01 RX ORDER — METOPROLOL SUCCINATE 25 MG/1
25 TABLET, EXTENDED RELEASE ORAL ONCE
Status: COMPLETED | OUTPATIENT
Start: 2019-07-01 | End: 2019-07-01

## 2019-07-01 RX ORDER — POTASSIUM CHLORIDE 20 MEQ/1
40 TABLET, EXTENDED RELEASE ORAL
Status: COMPLETED | OUTPATIENT
Start: 2019-07-01 | End: 2019-07-01

## 2019-07-01 RX ADMIN — POTASSIUM CHLORIDE 40 MEQ: 20 TABLET, EXTENDED RELEASE ORAL at 09:16

## 2019-07-01 RX ADMIN — HYDRALAZINE HYDROCHLORIDE AND ISOSORBIDE DINITRATE 1 TABLET: 37.5; 2 TABLET, FILM COATED ORAL at 21:58

## 2019-07-01 RX ADMIN — PRAVASTATIN SODIUM 20 MG: 20 TABLET ORAL at 21:59

## 2019-07-01 RX ADMIN — SPIRONOLACTONE 25 MG: 25 TABLET, FILM COATED ORAL at 09:16

## 2019-07-01 RX ADMIN — ASPIRIN 81 MG: 81 TABLET, COATED ORAL at 09:15

## 2019-07-01 RX ADMIN — METOPROLOL SUCCINATE 50 MG: 50 TABLET, EXTENDED RELEASE ORAL at 09:16

## 2019-07-01 RX ADMIN — AMLODIPINE BESYLATE 10 MG: 5 TABLET ORAL at 09:15

## 2019-07-01 RX ADMIN — METOPROLOL SUCCINATE 25 MG: 25 TABLET, EXTENDED RELEASE ORAL at 12:50

## 2019-07-01 RX ADMIN — HYDRALAZINE HYDROCHLORIDE AND ISOSORBIDE DINITRATE 1 TABLET: 37.5; 2 TABLET, FILM COATED ORAL at 16:41

## 2019-07-01 RX ADMIN — FAMOTIDINE 20 MG: 20 TABLET ORAL at 09:15

## 2019-07-01 RX ADMIN — FAMOTIDINE 20 MG: 20 TABLET ORAL at 21:59

## 2019-07-01 RX ADMIN — SIMETHICONE CHEW TAB 80 MG 80 MG: 80 TABLET ORAL at 19:48

## 2019-07-01 RX ADMIN — FAMOTIDINE 20 MG: 20 TABLET ORAL at 04:13

## 2019-07-01 RX ADMIN — ACETAMINOPHEN 650 MG: 325 TABLET ORAL at 09:16

## 2019-07-01 RX ADMIN — DILTIAZEM HYDROCHLORIDE 180 MG: 180 CAPSULE, COATED, EXTENDED RELEASE ORAL at 09:16

## 2019-07-01 RX ADMIN — HYDRALAZINE HYDROCHLORIDE AND ISOSORBIDE DINITRATE 1 TABLET: 37.5; 2 TABLET, FILM COATED ORAL at 09:15

## 2019-07-01 RX ADMIN — HEPARIN SODIUM 11 UNITS/KG/HR: 10000 INJECTION, SOLUTION INTRAVENOUS at 23:38

## 2019-07-01 NOTE — PROGRESS NOTES
Chart reviewed, met with pt in room. Pt admitted from home, plans discharge to [facility] when stable. Pt states he lives alone, but his mother is close by and visits. 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 Pt agreeable to [facility] visit, will place referral. No DME needs at this time, ot has cane and walker available, but does not use currently; no other discharge needs noted, CM remains available. Cardiologist is Dr. Natasha Rodríguez per pt. Care Management Interventions  PCP Verified by CM:  Yes  Transition of Care Consult (CM Consult): [address]: Yes  Current Support Network: Own Home  Confirm Follow Up Transport: Self  Plan discussed with Pt/Family/Caregiver: Yes  Discharge Location  Discharge Placement: Home with home health(H)

## 2019-07-01 NOTE — PROGRESS NOTES
Cardiology Progress Note        Patient: Isael Sol        Sex: male          DOA: 6/29/2019  YOB: 1955      Age:  62 y.o.        LOS:  LOS: 1 day    Patient seen and examined, chart reviewed  Assessment/Plan     Patient Active Problem List   Diagnosis Code    Hypertension I10    CHF (congestive heart failure) ([facility] I50.9    HAYS (dyspnea on exertion) R06.09              DM (diabetes mellitus) [facility] E11.9    Atrial fibrillation (HCC) I48.91    New onset a-fib (HCC) I48.91      Nonischemic cardiomyopathy  Atrial fibrillation with rapid ventricular rate CHADsVASc score 4.0   Abnormal troponin: due to A. Fib with RVR and hypertension, no evidence of acute coronary syndrome, most likely due to demand ischemia. Hypokalemia     Echocardiogram done today revealed global hypokinesis, severe concentric LVH, LVEF 45%. Patient had LEFT heart catheterization, right heart catheterization and coronary angiogram done in April 2018. Plan:    Continue Cardizem  mg once a day and Metoprolol succinate 50 mg once a day. Give additional dose of Metoprolol 25 mg now. Titrate and discontinue IV Cardizem. Continue IV heparin for now. Patient is candidate for long-term oral anti-coagulation. Various oral anticoagulant agents discussed with patient. All risks, benefits and alternatives explained to patient and he verbally understood. Patient would like to go with newer oral anticoagulant agent. Ok to start Xarelto and discontinue IV heparin if ok from GI stand point. Patient had abdominal ultrasound. Monitor and replace electrolytes as per hospital medicine   Plan discussed with patient. Plan discussed with Darrel ROBERTSON              Subjective:    cc:  Denies any shortness of breath. REVIEW OF SYSTEMS:     General: No fevers or chills.   Cardiovascular: No chest pain,No palpitations, No orthopnea, No PND, No leg swelling, No claudication  Pulmonary: No dyspnea. Gastrointestinal: No nausea, vomiting, bleeding  Neurology: No Dizziness    Objective:      Visit Vitals  /73 (BP 1 Location: Right arm, BP Patient Position: At rest;Supine)   Pulse 82   Temp 97.6 °F (36.4 °C)   Resp 16   Ht 5' 6\" (1.676 m)   Wt 89.8 kg (198 lb)   SpO2 99%   BMI 31.96 kg/m²     Body mass index is 31.96 kg/m². Physical Exam:  General Appearance: Comfortable, not using accessory muscles of respiration. HEENT: FLOYD. HEAD: Atraumatic  NECK: No JVD, no thyroidomeglay. CAROTIDS:no bruit  LUNGS: Clear bilaterally. HEART: S1+S2 audible,irregularly irregular, no murmur, no pericardial rub. ABD: Non-tender, BS Audible    EXT: No edema, and no cyanosis. VASCULAR EXAM: Pulses are intact. PSYCHIATRIC EXAM: Mood is appropriate. MUSCULOSKELETAL: Grossly no joint deformity.   NEUROLOGICAL: AAO times 3, No motor and sensory deficit  Medication:  Current Facility-Administered Medications   Medication Dose Route Frequency    heparin 25,000 units in D5W 250 ml infusion  18-36 Units/kg/hr IntraVENous TITRATE    famotidine (PEPCID) tablet 20 mg  20 mg Oral BID    acetaminophen (TYLENOL) tablet 650 mg  650 mg Oral Q4H PRN    ondansetron (ZOFRAN) injection 4 mg  4 mg IntraVENous Q4H PRN    insulin lispro (HUMALOG) injection   SubCUTAneous AC&HS    glucose chewable tablet 16 g  4 Tab Oral PRN    glucagon (GLUCAGEN) injection 1 mg  1 mg IntraMUSCular PRN    labetalol (NORMODYNE;TRANDATE) 20 mg/4 mL (5 mg/mL) injection 10 mg  10 mg IntraVENous Q4H PRN    aspirin delayed-release tablet 81 mg  81 mg Oral DAILY    isosorbide-hydrALAZINE (BIDIL) 20-37.5 mg tablet 1 Tab  1 Tab Oral TID    metoprolol succinate (TOPROL-XL) XL tablet 50 mg  50 mg Oral DAILY    pravastatin (PRAVACHOL) tablet 20 mg  20 mg Oral QHS    spironolactone (ALDACTONE) tablet 25 mg  25 mg Oral DAILY    amLODIPine (NORVASC) tablet 10 mg  10 mg Oral DAILY    simethicone (MYLICON) tablet 80 mg  80 mg Oral QID PRN  dilTIAZem CD (CARDIZEM CD) capsule 180 mg  180 mg Oral DAILY    hydrALAZINE (APRESOLINE) 20 mg/mL injection 10 mg  10 mg IntraVENous Q6H PRN               Lab/Data Reviewed:       Recent Labs     07/01/19 0427 06/30/19  0540 06/29/19  1700   WBC 5.1 5.1 3.5*   HGB 13.7 14.1 14.7   HCT 43.2 43.6 44.6    167 165     Recent Labs     07/01/19 0427 06/30/19  0540 06/29/19  1700    142 144   K 3.4* 4.0 3.9    108 109*   CO2 27 23 25   * 93 134*   BUN 14 14 13   CREA 1.05 1.18 1.44*   CA 8.8 8.8 8.9       Signed By: Elayne Yen MD     July 1, 2019

## 2019-07-01 NOTE — PROGRESS NOTES
Hospitalist Progress Note    Patient: Claudetta Basque MRN: [id number]  CSN: [id number]    YOB: 1955  Age: 62 y.o. Sex: male    DOA: 6/29/2019 LOS:  LOS: 1 day          Chief Complaint:    Afib RVR    Assessment/Plan     1. New onset atrial fibrillation with RVR  2. Chronic systolic/diastolic CHF  3. Pulmonary hypertension  4. NIDDM2  5. Dehydration  6. Constipation  7. Elevated troponin  8. Renal insufficiency    1. Patient's HR improved since this AM. Discussed case with Dr Gabrielle Ivey. Patient was given an additional 25mg of Toprol XL this AM. PO Cardizem remains at 180mg qday. Drip has been discontinued. He remains on heparin drip at this time, Dr Gabrielle Ivey discussed anticoagulation with patient and patient prefers NOAC. Will start NOAC tomorrow. 2. Echocardiogram repeated which noted again reduced LVEF 41-45%. Monitor fluid balance, no sign of fluid overload. 3. Will need continued follow up with cardiology upon discharge. Sees Dr Devi Mccabe in clinic. 4. Blood glucose levels are well controlled. Continue SSI and accuchecks. 5. Improved, no changes. 6. States constipation is what brought him to ER in the first place. No BM but no complaints of abdominal pain during exam.   7. In review of labs, patient's troponin has been elevated on every evaluation dating back to 2013. Await cardiology recommendations. 8. Improved, creatinine returned to normal as did GFR. DVT prophylaxis with heparin drip. Disposition: 24-48 hours, home, pending cardiology recommendations. Patient Active Problem List   Diagnosis Code    Hypertension I10    CHF (congestive heart failure) ([facility]) I50.9    HAYS (dyspnea on exertion) R06.09    NSTEMI (non-ST elevated myocardial infarction) ([facility]) I21.4    Hypertensive emergency I16.1    DM (diabetes mellitus) ([facility] E11.9    Atrial fibrillation ([facility]) I48.91    New onset a-fib ([facility]) I48.91       Subjective:    Feeling better than admission.    No complaints of abdominal pain, no palpitations.      Review of systems:    Constitutional: denies fevers, chills, myalgias  Respiratory: denies SOB, cough  Cardiovascular: denies chest pain, palpitations  Gastrointestinal: denies nausea, vomiting, diarrhea      Vital signs/Intake and Output:  Visit Vitals  /89 (BP 1 Location: Right arm, BP Patient Position: At rest;Supine)   Pulse 87   Temp 97.4 °F (36.3 °C)   Resp 16   Ht 5' 6\" (1.676 m)   Wt 89.8 kg (198 lb)   SpO2 99%   BMI 31.96 kg/m²     Current Shift:  07/01 0701 - 07/01 1900  In: 240 [P.O.:240]  Out: 650 [Urine:650]  Last three shifts:  06/29 1901 - 07/01 0700  In: 240 [P.O.:240]  Out: 500 [Urine:500]    Exam:    General: Elderly appearing AA male, alert, NAD, OX3  Head/Neck: NCAT, supple, No masses, No lymphadenopathy  CVS:irregularly irregular, no M/R/G, S1/S2 heard, no thrill  Lungs:Clear to auscultation bilaterally, no wheezes, rhonchi, or rales  Abdomen: Soft, Nontender, No distention, Normal Bowel sounds, No hepatomegaly  Extremities: No C/C/E, pulses palpable 2+  Skin:normal texture and turgor, no rashes, no lesions  Neuro:grossly normal , follows commands  Psych:appropriate                Labs: Results:       Chemistry Recent Labs     07/01/19 0427 06/30/19  0540 06/29/19  1700   * 93 134*    142 144   K 3.4* 4.0 3.9    108 109*   CO2 27 23 25   BUN 14 14 13   CREA 1.05 1.18 1.44*   CA 8.8 8.8 8.9   AGAP 8 11 10   BUCR 13 12 9*   AP 47 43* 46   TP 6.8 7.3 7.4   ALB 3.6 3.9 3.8   GLOB 3.2 3.4 3.6   AGRAT 1.1 1.1 1.1      CBC w/Diff Recent Labs     07/01/19 0427 06/30/19  0540 06/29/19  1700   WBC 5.1 5.1 3.5*   RBC 4.52* 4.56* 4.73   HGB 13.7 14.1 14.7   HCT 43.2 43.6 44.6    167 165   GRANS 59 57 63   LYMPH 30 33 29   EOS 2 0 0      Cardiac Enzymes Recent Labs     06/30/19  0540 06/29/19  1700   CPK 85 79   CKND1 1.3 CALCULATION NOT PERFORMED WHEN RESULT IS BELOW LINEAR LIMIT      Coagulation Recent Labs     07/01/19  1424 07/01/19 0427   PTP  --  15.7*   INR  --  1.3*   APTT 89.6* 124.7*       Lipid Panel Lab Results   Component Value Date/Time    Cholesterol, total 126 10/02/2013 04:38 AM    HDL Cholesterol 33 (L) 10/02/2013 04:38 AM    LDL, calculated 82 10/02/2013 04:38 AM    VLDL, calculated 11 10/02/2013 04:38 AM    Triglyceride 55 10/02/2013 04:38 AM    CHOL/HDL Ratio 3.8 10/02/2013 04:38 AM      BNP No results for input(s): BNPP in the last 72 hours.    Liver Enzymes Recent Labs     07/01/19 0427   TP 6.8   ALB 3.6   AP 47   SGOT 18      Thyroid Studies Lab Results   Component Value Date/Time    TSH 4.08 (H) 06/30/2019 05:40 AM        Procedures/imaging: see electronic medical records for all procedures/Xrays and details which were not copied into this note but were reviewed prior to creation of [address], PA-C

## 2019-07-01 NOTE — PROGRESS NOTES
2300: In room to change heparin bag with patient. While in room patient had a 10 beat run of V-tach, patient was asymptomatic, vitals obtained and in flow sheet, all WNL. Nocturnist paged, Dr. Susie Mares responded. Magnesium to be checked in AM, patient continued to be monitored. Patient was in a.fib prior to V-Tach and remains in a.fib with a controlled rate. 7579: Patient had another 9 beats of V-Tach. Vitals stable. See flowsheet. Dr. Susie Mares aware, will continue to monitor.

## 2019-07-01 NOTE — PROGRESS NOTES
Problem: Falls - Risk of  Goal: *Absence of Falls  Description  Document Gilmajensen Penny Fall Risk and appropriate interventions in the flowsheet.   Outcome: Progressing Towards Goal  Note:   Fall Risk Interventions:            Medication Interventions: Patient to call before getting OOB, Teach patient to arise slowly         History of Falls Interventions: Evaluate medications/consider consulting pharmacy         Problem: Patient Education: Go to Patient Education Activity  Goal: Patient/Family Education  Outcome: Progressing Towards Goal

## 2019-07-01 NOTE — PROGRESS NOTES
0720:Received verbal bedside report from off going nurse ANGELA Riley R.N. Patient care received. Patient alert and oriented x 4. Patient resting in bed. Patient stable. Call light with in reach bed in lowest position. 1808:Pt had 33 beat run of V-Tach. Pt said he felt palpations and no chest pain or shortness of breath.  informed of patient having 33 beat run of V-Tach. He said to check patients magnesium and potassium level.

## 2019-07-02 ENCOUNTER — HOME HEALTH ADMISSION (OUTPATIENT)
Dept: HOME HEALTH SERVICES | Facility: HOME HEALTH | Age: 64
End: 2019-07-02

## 2019-07-02 VITALS
BODY MASS INDEX: 31.82 KG/M2 | OXYGEN SATURATION: 95 % | TEMPERATURE: 97.8 F | RESPIRATION RATE: 16 BRPM | HEIGHT: 66 IN | WEIGHT: 198 LBS | DIASTOLIC BLOOD PRESSURE: 68 MMHG | SYSTOLIC BLOOD PRESSURE: 155 MMHG | HEART RATE: 95 BPM

## 2019-07-02 LAB
ALBUMIN SERPL-MCNC: 3.7 G/DL (ref 3.4–5)
ALBUMIN/GLOB SERPL: 1.1 {RATIO} (ref 0.8–1.7)
ALP SERPL-CCNC: 51 U/L (ref 45–117)
ALT SERPL-CCNC: 14 U/L (ref 16–61)
ANION GAP SERPL CALC-SCNC: 8 MMOL/L (ref 3–18)
APTT PPP: 68.2 SEC (ref 23–36.4)
AST SERPL-CCNC: 17 U/L (ref 15–37)
BASOPHILS # BLD: 0 K/UL (ref 0–0.1)
BASOPHILS NFR BLD: 0 % (ref 0–2)
BILIRUB SERPL-MCNC: 2.1 MG/DL (ref 0.2–1)
BUN SERPL-MCNC: 12 MG/DL (ref 7–18)
BUN/CREAT SERPL: 10 (ref 12–20)
CALCIUM SERPL-MCNC: 9.2 MG/DL (ref 8.5–10.1)
CHLORIDE SERPL-SCNC: 107 MMOL/L (ref 100–108)
CO2 SERPL-SCNC: 25 MMOL/L (ref 21–32)
CREAT SERPL-MCNC: 1.15 MG/DL (ref 0.6–1.3)
DIFFERENTIAL METHOD BLD: ABNORMAL
EOSINOPHIL # BLD: 0.1 K/UL (ref 0–0.4)
EOSINOPHIL NFR BLD: 2 % (ref 0–5)
ERYTHROCYTE [DISTWIDTH] IN BLOOD BY AUTOMATED COUNT: 15.5 % (ref 11.6–14.5)
GLOBULIN SER CALC-MCNC: 3.5 G/DL (ref 2–4)
GLUCOSE BLD STRIP.AUTO-MCNC: 110 MG/DL (ref 70–110)
GLUCOSE BLD STRIP.AUTO-MCNC: 98 MG/DL (ref 70–110)
GLUCOSE SERPL-MCNC: 96 MG/DL (ref 74–99)
HCT VFR BLD AUTO: 43.3 % (ref 36–48)
HGB BLD-MCNC: 14.2 G/DL (ref 13–16)
LYMPHOCYTES # BLD: 1.6 K/UL (ref 0.9–3.6)
LYMPHOCYTES NFR BLD: 33 % (ref 21–52)
MCH RBC QN AUTO: 31.1 PG (ref 24–34)
MCHC RBC AUTO-ENTMCNC: 32.8 G/DL (ref 31–37)
MCV RBC AUTO: 95 FL (ref 74–97)
MONOCYTES # BLD: 0.4 K/UL (ref 0.05–1.2)
MONOCYTES NFR BLD: 9 % (ref 3–10)
NEUTS SEG # BLD: 2.8 K/UL (ref 1.8–8)
NEUTS SEG NFR BLD: 56 % (ref 40–73)
PLATELET # BLD AUTO: 167 K/UL (ref 135–420)
PMV BLD AUTO: 10.5 FL (ref 9.2–11.8)
POTASSIUM SERPL-SCNC: 3.7 MMOL/L (ref 3.5–5.5)
PROT SERPL-MCNC: 7.2 G/DL (ref 6.4–8.2)
RBC # BLD AUTO: 4.56 M/UL (ref 4.7–5.5)
SODIUM SERPL-SCNC: 140 MMOL/L (ref 136–145)
WBC # BLD AUTO: 4.9 K/UL (ref 4.6–13.2)

## 2019-07-02 PROCEDURE — 74011250637 HC RX REV CODE- 250/637: Performed by: INTERNAL MEDICINE

## 2019-07-02 PROCEDURE — 82962 GLUCOSE BLOOD TEST: CPT

## 2019-07-02 PROCEDURE — 74011250636 HC RX REV CODE- 250/636: Performed by: FAMILY MEDICINE

## 2019-07-02 PROCEDURE — 36415 COLL VENOUS BLD VENIPUNCTURE: CPT

## 2019-07-02 PROCEDURE — 74011250637 HC RX REV CODE- 250/637: Performed by: PHYSICIAN ASSISTANT

## 2019-07-02 PROCEDURE — 80053 COMPREHEN METABOLIC PANEL: CPT

## 2019-07-02 PROCEDURE — 85025 COMPLETE CBC W/AUTO DIFF WBC: CPT

## 2019-07-02 PROCEDURE — 74011250636 HC RX REV CODE- 250/636: Performed by: INTERNAL MEDICINE

## 2019-07-02 PROCEDURE — 85730 THROMBOPLASTIN TIME PARTIAL: CPT

## 2019-07-02 RX ORDER — METOPROLOL SUCCINATE 50 MG/1
50 TABLET, EXTENDED RELEASE ORAL 2 TIMES DAILY
Status: DISCONTINUED | OUTPATIENT
Start: 2019-07-02 | End: 2019-07-02 | Stop reason: HOSPADM

## 2019-07-02 RX ORDER — FAMOTIDINE 20 MG/1
20 TABLET, FILM COATED ORAL 2 TIMES DAILY
Qty: 60 TAB | Refills: 0 | Status: SHIPPED | OUTPATIENT
Start: 2019-07-02

## 2019-07-02 RX ORDER — ISOSORBIDE DINITRATE AND HYDRALAZINE HYDROCHLORIDE 37.5; 2 MG/1; MG/1
1 TABLET ORAL 3 TIMES DAILY
Qty: 90 TAB | Refills: 0 | Status: SHIPPED
Start: 2019-07-02

## 2019-07-02 RX ORDER — MAGNESIUM SULFATE 1 G/100ML
1 INJECTION INTRAVENOUS ONCE
Status: COMPLETED | OUTPATIENT
Start: 2019-07-02 | End: 2019-07-02

## 2019-07-02 RX ORDER — METOPROLOL SUCCINATE 50 MG/1
50 TABLET, EXTENDED RELEASE ORAL 2 TIMES DAILY
Qty: 60 TAB | Refills: 0 | Status: SHIPPED | OUTPATIENT
Start: 2019-07-02

## 2019-07-02 RX ORDER — HEPARIN SODIUM 1000 [USP'U]/ML
40 INJECTION, SOLUTION INTRAVENOUS; SUBCUTANEOUS ONCE
Status: COMPLETED | OUTPATIENT
Start: 2019-07-02 | End: 2019-07-02

## 2019-07-02 RX ORDER — DILTIAZEM HYDROCHLORIDE 180 MG/1
180 CAPSULE, COATED, EXTENDED RELEASE ORAL DAILY
Qty: 30 CAP | Refills: 0 | Status: SHIPPED | OUTPATIENT
Start: 2019-07-03

## 2019-07-02 RX ADMIN — SPIRONOLACTONE 25 MG: 25 TABLET, FILM COATED ORAL at 09:21

## 2019-07-02 RX ADMIN — ASPIRIN 81 MG: 81 TABLET, COATED ORAL at 09:20

## 2019-07-02 RX ADMIN — HYDRALAZINE HYDROCHLORIDE AND ISOSORBIDE DINITRATE 1 TABLET: 37.5; 2 TABLET, FILM COATED ORAL at 09:20

## 2019-07-02 RX ADMIN — DILTIAZEM HYDROCHLORIDE 180 MG: 180 CAPSULE, COATED, EXTENDED RELEASE ORAL at 09:20

## 2019-07-02 RX ADMIN — METOPROLOL SUCCINATE 50 MG: 50 TABLET, EXTENDED RELEASE ORAL at 09:20

## 2019-07-02 RX ADMIN — RIVAROXABAN 20 MG: 10 TABLET, FILM COATED ORAL at 09:21

## 2019-07-02 RX ADMIN — FAMOTIDINE 20 MG: 20 TABLET ORAL at 09:21

## 2019-07-02 RX ADMIN — MAGNESIUM SULFATE HEPTAHYDRATE 1 G: 1 INJECTION, SOLUTION INTRAVENOUS at 02:27

## 2019-07-02 RX ADMIN — HEPARIN SODIUM 3590 UNITS: 1000 INJECTION, SOLUTION INTRAVENOUS; SUBCUTANEOUS at 04:36

## 2019-07-02 NOTE — PROGRESS NOTES
Problem: Falls - Risk of  Goal: *Absence of Falls  Description  Document [place] Fall Risk and appropriate interventions in the flowsheet.   Outcome: Progressing Towards Goal     Problem: Patient Education: Go to Patient Education Activity  Goal: Patient/Family Education  Outcome: Progressing Towards Goal     Problem: Heart Failure: Day 3  Goal: Off Pathway (Use only if patient is Off Pathway)  Outcome: Progressing Towards Goal  Goal: Activity/Safety  Outcome: Progressing Towards Goal  Goal: Diagnostic Test/Procedures  Outcome: Progressing Towards Goal  Goal: Nutrition/Diet  Outcome: Progressing Towards Goal  Goal: Discharge Planning  Outcome: Progressing Towards Goal  Goal: Medications  Outcome: Progressing Towards Goal  Goal: Respiratory  Outcome: Progressing Towards Goal  Goal: Treatments/Interventions/Procedures  Outcome: Progressing Towards Goal  Goal: Psychosocial  Outcome: Progressing Towards Goal

## 2019-07-02 NOTE — PROGRESS NOTES
Problem: Falls - Risk of  Goal: *Absence of Falls  Description  Document Jose Zaid Fall Risk and appropriate interventions in the flowsheet.   Outcome: Progressing Towards Goal     Problem: Heart Failure: Day 1  Goal: Off Pathway (Use only if patient is Off Pathway)  Outcome: Progressing Towards Goal

## 2019-07-02 NOTE — DISCHARGE SUMMARY
Discharge Summary    Patient: Nadia Hogan MRN: [id number]  CSN: [id number]    YOB: 1955  Age: 62 y.o. Sex: male    DOA: 6/29/2019 LOS:  LOS: 2 days   Discharge Date:      Primary Care Provider:  Evon Pérez MD    Admission Diagnoses: Atrial fibrillation [facility]) [I48.91]  New onset a-fib [facility]) [I48.91]  CHF (congestive heart failure) ([facility] [I50.9]    Discharge Diagnoses:    Problem List as of 7/2/2019 Date Reviewed: 10/1/2013          Codes Class Noted - Resolved    Atrial fibrillation ([facility] ICD-10-CM: I48.91  ICD-9-CM: 427.31  6/30/2019 - Present        New onset a-fib ([facility] ICD-10-CM: I48.91  ICD-9-CM: 427.31  6/30/2019 - Present        NSTEMI (non-ST elevated myocardial infarction) ([place] [id number].) ICD-10-CM: I21.4  ICD-9-CM: 410.70  4/24/2018 - Present        Hypertensive emergency ICD-10-CM: I16.1  ICD-9-CM: 401.9  4/24/2018 - Present        DM (diabetes mellitus) ([facility] ICD-10-CM: E11.9  ICD-9-CM: 250.00  Unknown - Present        Hypertension ICD-10-CM: I10  ICD-9-CM: 401.9  Unknown - Present        CHF (congestive heart failure) ([place] [address].) ICD-10-CM: I50.9  ICD-9-CM: 428.0  10/1/2013 - Present        HAYS (dyspnea on exertion) ICD-10-CM: R06.09  ICD-9-CM: 786.09  10/1/2013 - Present              Discharge Medications:     Current Discharge Medication List      START taking these medications    Details   dilTIAZem CD (CARDIZEM CD) 180 mg ER capsule Take 1 Cap by mouth daily. Qty: 30 Cap, Refills: 0      famotidine (PEPCID) 20 mg tablet Take 1 Tab by mouth two (2) times a day. Qty: 60 Tab, Refills: 0      rivaroxaban (XARELTO) 20 mg tab tablet Take 1 Tab by mouth daily. Qty: 30 Tab, Refills: 11         CONTINUE these medications which have CHANGED    Details   metoprolol succinate (TOPROL-XL) 50 mg XL tablet Take 1 Tab by mouth two (2) times a day. Qty: 60 Tab, Refills: 0      isosorbide-hydrALAZINE (BIDIL) 20-37.5 mg per tablet Take 1 Tab by mouth three (3) times daily.   Qty: 90 Tab, Refills: 0 CONTINUE these medications which have NOT CHANGED    Details   spironolactone (ALDACTONE) 25 mg tablet Take  by mouth daily. aspirin delayed-release 81 mg tablet Take 1 Tab by mouth daily. Qty: 30 Tab, Refills: 5      furosemide (LASIX) 40 mg tablet Take 1 Tab by mouth daily. Qty: 30 Tab, Refills: 5      pravastatin (PRAVACHOL) 20 mg tablet Take 1 Tab by mouth nightly. Qty: 30 Tab, Refills: 5         STOP taking these medications       lisinopril (PRINIVIL, ZESTRIL) 40 mg tablet Comments:   Reason for Stopping:         potassium chloride (KLOR-CON) 20 mEq packet Comments:   Reason for Stopping:               Discharge Condition: Stable    Procedures : None    Consults: Cardiology      PHYSICAL EXAM    Visit Vitals  /68   Pulse 95   Temp 97.8 °F (36.6 °C)   Resp 16   Ht 5' 6\" (1.676 m)   Wt 89.8 kg (198 lb)   SpO2 95%   BMI 31.96 kg/m²     General: Elderly appearing AA male. Awake, cooperative, no acute distress    HEENT: NC, Atraumatic. PERRL, EOMI. Anicteric sclerae. Lungs:  CTA Bilaterally. No Wheezing/Rhonchi/Rales. Heart:  Irregularly irregular,  No murmur, No Rubs, No Gallops  Abdomen: Soft, Non distended, Non tender. +Bowel sounds,   Extremities: No c/c/e  Psych:   Not anxious or agitated. Neurologic:  No acute neurological deficits. Admission HPI : Salvador Falcon is a 62 y.o. male with past medical history significant for Chronic CHF, pulmonary hypertension, Dm2 & HTn presents to the ER with 3-4 days abdominal pain located in RUQ & LUQ, \" sharp\", no aleviating or aggravating factors, assocaited w constipation. He reports he took a laxative w some effect. He denies chest pain, pressure, palpitations, dyspnea or vomiting.     On prsentaiton to the ER he was afebrile, in a fib w RVR, hypertensive. He wsa in afib w clear lung fields. Abdomen w minimal distention. EKG w AFib, labs w mild hypokalemia, tropnin up to 1.9 w normal indices. BNp elevated.  He had CTA of chest which was negative for PE, pulmonary HTN w TR and mild pericardial effusion. CT of abdomen w trace ascites and possible pericholecystic fluid. Medicine is asked to admit for further management. Hospital Course : This patient was admitted to medical services on June 29, 2019 for atrial fibrillation with RVR. Other admission diagnoses include constipation, abdominal pain, hypertension, and elevated troponin. The patient was admitted to the telemetry unit for further care. He had a CTA chest on admission which was negative for pulmonary embolism. He was started on a heparin drip and Cardizem drip. For his abdominal pain, he underwent a CT abdomen and pelvis, which was rather unremarkable, other than ascites noted in the RUQ. The patient then underwent a RUQ ultrasound which was unremarkable. The patient's abdominal pain subsided and he voiced no further complaints about this throughout his hospitalization. Cardiology was consulted and the patient was seen by Dr Paulina Merritt. He was transitioned from the Cardizem drip to oral Cardizem, and was also started on Toprol XL. The patient remains in atrial fibrillation at the time of discharge, however, his rate is controlled. The patient was educated on anticoagulation, specifically coumadin vs NOAC, and education include R/B/A. The patient stated that he preferred NOAC, specifically Xarelto. He was started on Xarelto prior to discharge and the heparin drip was discontinued. The Xarelto prescription has been sent to the outpatient pharmacy at [facility] for the patient to  upon discharge. His blood pressure was monitored over the course of his hospitalization and has improved. It has remained stable over the last 24 hours. The patient's elevated troponin was monitored and remained stable. He underwent an echocardiogram which showed a known reduced LVEF. He did not complain of any chest pain during his hospitalization.      He will be discharged today in stable condition. He follows with Dr Milka Lyons, cardiologist, usually, and was advised to follow up with him within 2-4 weeks upon discharge. He was also advised to follow up with his PCP Dr Taty Prakash. Activity: Activity as tolerated    Diet: Cardiac Diet    Follow-up: PCP; Cardiology    Disposition: Home    Minutes spent on discharge: 42       Labs: Results:       Chemistry Recent Labs     07/02/19 0237 07/01/19  1424 07/01/19 0427 06/30/19  0540   GLU 96  --  109* 93     --  141 142   K 3.7 4.1 3.4* 4.0     --  106 108   CO2 25  --  27 23   BUN 12  --  14 14   CREA 1.15  --  1.05 1.18   CA 9.2  --  8.8 8.8   AGAP 8  --  8 11   BUCR 10*  --  13 12   AP 51  --  47 43*   TP 7.2  --  6.8 7.3   ALB 3.7  --  3.6 3.9   GLOB 3.5  --  3.2 3.4   AGRAT 1.1  --  1.1 1.1      CBC w/Diff Recent Labs     07/02/19 0237 07/01/19 0427 06/30/19  0540   WBC 4.9 5.1 5.1   RBC 4.56* 4.52* 4.56*   HGB 14.2 13.7 14.1   HCT 43.3 43.2 43.6    167 167   GRANS 56 59 57   LYMPH 33 30 33   EOS 2 2 0      Cardiac Enzymes Recent Labs     06/30/19  0540 06/29/19  1700   CPK 85 79   CKND1 1.3 CALCULATION NOT PERFORMED WHEN RESULT IS BELOW LINEAR LIMIT      Coagulation Recent Labs     07/02/19 0237 07/01/19 1424 07/01/19  0427   PTP  --   --  15.7*   INR  --   --  1.3*   APTT 68.2* 89.6* 124.7*       Lipid Panel Lab Results   Component Value Date/Time    Cholesterol, total 126 10/02/2013 04:38 AM    HDL Cholesterol 33 (L) 10/02/2013 04:38 AM    LDL, calculated 82 10/02/2013 04:38 AM    VLDL, calculated 11 10/02/2013 04:38 AM    Triglyceride 55 10/02/2013 04:38 AM    CHOL/HDL Ratio 3.8 10/02/2013 04:38 AM      BNP No results for input(s): BNPP in the last 72 hours.    Liver Enzymes Recent Labs     07/02/19  0237   TP 7.2   ALB 3.7   AP 51   SGOT 17      Thyroid Studies Lab Results   Component Value Date/Time    TSH 4.08 (H) 06/30/2019 05:40 AM            Significant Diagnostic Studies: Xr Abd Acute W 1 V Chest    Result Date: 6/29/2019  EXAM: PA chest x-ray with flat and upright views of the abdomen INDICATION: Upper abdominal pain constipation x3 days COMPARISON: October 28, 2018 _______________ FINDINGS: Chest: Heart size is enlarged and mediastinal contours are unremarkable. Lungs are clear. There is prominence of the central pulmonary vasculature suggesting pulmonary venous congestion. There are no effusions. No acute osseous findings. ABDOMEN: There is no free air. There are mildly dilated small bowel loops in left upper quadrant measuring up to 3.2 cm with mural thickening. Findings are suspicious for small bowel obstruction. Advise CT for further evaluation. No abnormal calcifications seen. No acute osseous findings. _______________     IMPRESSION: Chest: Mild pulmonary venous congestion without focal infiltrate or effusion. Mildly dilated small bowel loops with mural thickening in the left upper quadrant suggesting small bowel obstruction with possible edema to the wall. Advise CT abdomen and pelvis for further evaluation. No free air. Cta Chest W Or W Wo Cont    Result Date: 6/29/2019  EXAM: CTA chest INDICATION: Chest pain COMPARISON: None TECHNIQUE: Axial CT imaging from the thoracic inlet through the diaphragm with intravenous contrast. Coronal and sagittal MIP reformats were generated. One or more dose reduction techniques were used on this CT: automated exposure control, adjustment of the mAs and/or kVp according to patient size, and iterative reconstruction techniques. The specific techniques used on this CT exam have been documented in the patient's electronic medical record. Digital Imaging and Communications in Medicine (DICOM) format image data are available to nonaffiliated external healthcare facilities or entities on a secure, media free, reciprocally searchable basis with patient authorization for at least a 12-month period after this study.  _______________ FINDINGS: EXAM QUALITY: Overall exam quality is satisfactory. Pulmonary arterial enhancement is adequate with suboptimal breath hold and there is moderate to significant breathing motion artifact limiting evaluation. PULMONARY ARTERIES: No evidence of central pulmonary embolism. Evaluation for peripheral pulmonary emboli are limited by breathing motion artifacts LYMPH Nodes: No enlarged lymph nodes seen PLEURA: There are small right pleural effusions HEART: Cardiac size is enlarged. There is minimal pericardial effusion. There is tricuspid regurgitation into the hepatic veins suggesting right heart dysfunction. VASCULATURE/MEDIASTINUM: Pulmonary artery is enlarged measuring 3.8 cm suggesting pulmonary hypertension. There is a small hiatal hernia. LUNGS: No suspicious nodule or mass. No abnormal opacities. AIRWAY: Patent UPPER ABDOMEN: There is hyperplasia of the adrenals. Visualized liver, spleen, upper pole of both kidneys and tail the pancreas are unremarkable. OTHER: No acute or aggressive osseous abnormalities identified. _______________     IMPRESSION: There is no evidence of a central pulmonary embolism. Evaluation for peripheral pulmonary emboli are limited by breathing motion artifact. Pulmonary hypertension with enlarged pulmonary arteries Cardiomegaly with small amount of pericardial effusion Small hiatal hernia Tricuspid regurgitation Hyperplasia of the adrenals     Ct Abd Pelv Wo Cont    Result Date: 6/29/2019  EXAM: CT of the abdomen and pelvis INDICATION: Abdominal pain COMPARISON: None. TECHNIQUE: Axial CT imaging of the abdomen and pelvis was performed without intravenous contrast. Multiplanar reformats were generated. One or more dose reduction techniques were used on this CT: automated exposure control, adjustment of the mAs and/or kVp according to patient size, and iterative reconstruction techniques. The specific techniques used on this CT exam have been documented in the patient's electronic medical record.  Digital Imaging and Communications in Medicine (DICOM) format image data are available to nonaffiliated external healthcare facilities or entities on a secure, media free, reciprocally searchable basis with patient authorization for at least a 12-month period after this study. _______________ FINDINGS: LOWER CHEST: There is cardiomegaly with small amount of pericardial effusion. LIVER, BILIARY: No focal hepatic lesion seen. No biliary dilation. There is pericholecystic fluid. No gallbladder wall thickening or definite radiodense stones seen. PANCREAS: Normal. SPLEEN: Normal. ADRENALS: Normal. KIDNEYS: Right kidneys unremarkable. There is perinephric stranding around the left kidney. There is no hydronephrosis on either side. VASCULATURE: Mild calcific atherosclerosis present. LYMPH NODES: No enlarged lymph nodes. GASTROINTESTINAL TRACT: No bowel dilation or wall thickening. There is colonic diverticulosis. Appendix is normal in thickness. No definite evidence of acute appendicitis. PELVIC ORGANS: Prostate is enlarged. The urinary bladder is under distended therefore difficult to evaluate. BONES: No acute or aggressive osseous abnormalities identified. OTHER: Small amount of ascites present _______________     IMPRESSION: Small amount of ascites present. There is minimal pericardial effusion. There is pericholecystic fluid which may be secondary to the ascites. However if patient has right upper quadrant symptoms then advise right upper quadrant ultrasound. Minimal left perinephric stranding. This is nonspecific. Colonic diverticulosis. [id number] [facility]    Result Date: 6/30/2019  ============================ [facility] ============================ EXAM: Abdominal Ultrasound limited COMPARISON: CT abdomen and pelvis of one day earlier FINDINGS: Liver: Normal in size and echo texture, with no masses to suggest malignancy, either primary or metastatic. Liver measures 17.2 cm.  Portal vein is patent with normal directional portal venous flow. It measures 0.6 cm. Gallbladder and bile ducts: No evidence of gallstones and no bile duct dilation. CBD measures 4.8 mm. No gallbladder wall thickening or pericholecystic fluid is seen. Technologist reports a negative sonographic Sevilla's sign Pancreas: Normal in appearance. Pancreatic duct measures 1.4 mm. Right kidney: Normal in size and appearance with no acute findings. It measures 11.1 cm. Other: Trace ascites. IVC: Normal as visualized. IMPRESSION: No acute sonographic findings are seen. No results found for this or any previous visit.         CC: Kelsie Shook MD

## 2019-07-02 NOTE — PROGRESS NOTES
Cardiology Progress Note        Patient: Isael Sol        Sex: male          DOA: 6/29/2019  YOB: 1955      Age:  62 y.o.        LOS:  LOS: 2 days    Patient seen and examined, chart reviewed. Assessment/Plan     Patient Active Problem List   Diagnosis Code    Hypertension I10    CHF (congestive heart failure) ([facility]) I50.9    HAYS (dyspnea on exertion) R06.09         Hypertensive emergency I16.1    DM (diabetes mellitus) ([facility] E11.9    Atrial fibrillation ([facility]) I48.91    New onset a-fib ([facility]) I48.91      Chronic systolic heart failure   Non sustained ventricular tachycardia   Nonischemic cardiomyopathy  Atrial fibrillation with rapid ventricular rate CHADsVASc score 4.0   Abnormal troponin: due to A. Fib with RVR and hypertension, no evidence of acute coronary syndrome, most likely due to demand ischemia. Hypokalemia      Echocardiogram done today revealed global hypokinesis, severe concentric LVH, LVEF 45%.     Patient had LEFT heart catheterization, right heart catheterization and coronary angiogram done in April 2018.     Plan:     Continue Cardizem  mg once a day   Change Metoprolol succinate to 50 mg to twice a day. Continue Xarelto  Monitor and replace electrolytes as per hospital medicine   Salt restriction up to 2 g per day and fluid restriction up to 1.2 L per day. Follow up with  in 2 weeks. Plan discussed with patient. Plan discussed with Darrel ROBERTSON            Subjective:    cc: I feel much better. REVIEW OF SYSTEMS:     General: No fevers or chills.   Cardiovascular: No chest pain,No palpitations, No orthopnea, No PND, No leg swelling, No claudication  Pulmonary: No dyspnea   Gastrointestinal: No nausea, vomiting, bleeding  Neurology: No Dizziness    Objective:      Visit Vitals  /68   Pulse 95   Temp 97.8 °F (36.6 °C)   Resp 16   Ht 5' 6\" (1.676 m)   Wt 89.8 kg (198 lb)   SpO2 95%   BMI 31.96 kg/m²     Body mass index is 31.96 kg/m². Physical Exam:  General Appearance: Comfortable, not using accessory muscles of respiration. HEENT: FLOYD. HEAD: Atraumatic  NECK: No JVD, no thyroidomeglay. CAROTIDS: No bruit  LUNGS: Clear bilaterally. HEART: S1+S2 audible, irregularly irregular, no murmur, no pericardial rub. ABD: Non-tender, BS Audible    EXT: No edema, and no cyanosis. VASCULAR EXAM: Pulses are intact. PSYCHIATRIC EXAM: Mood is appropriate. MUSCULOSKELETAL: Grossly no joint deformity.   NEUROLOGICAL: AAO times 3, No motor and sensory deficit  Medication:  Current Facility-Administered Medications   Medication Dose Route Frequency    rivaroxaban (XARELTO) tablet 20 mg  20 mg Oral DAILY    metoprolol succinate (TOPROL-XL) XL tablet 50 mg  50 mg Oral BID    famotidine (PEPCID) tablet 20 mg  20 mg Oral BID    acetaminophen (TYLENOL) tablet 650 mg  650 mg Oral Q4H PRN    ondansetron (ZOFRAN) injection 4 mg  4 mg IntraVENous Q4H PRN    insulin lispro (HUMALOG) injection   SubCUTAneous AC&HS    glucose chewable tablet 16 g  4 Tab Oral PRN    glucagon (GLUCAGEN) injection 1 mg  1 mg IntraMUSCular PRN    labetalol (NORMODYNE;TRANDATE) 20 mg/4 mL (5 mg/mL) injection 10 mg  10 mg IntraVENous Q4H PRN    aspirin delayed-release tablet 81 mg  81 mg Oral DAILY    isosorbide-hydrALAZINE (BIDIL) 20-37.5 mg tablet 1 Tab  1 Tab Oral TID    pravastatin (PRAVACHOL) tablet 20 mg  20 mg Oral QHS    spironolactone (ALDACTONE) tablet 25 mg  25 mg Oral DAILY    simethicone (MYLICON) tablet 80 mg  80 mg Oral QID PRN    dilTIAZem CD (CARDIZEM CD) capsule 180 mg  180 mg Oral DAILY    hydrALAZINE (APRESOLINE) 20 mg/mL injection 10 mg  10 mg IntraVENous Q6H PRN               Lab/Data Reviewed:       Recent Labs     07/02/19  0237 07/01/19  0427 06/30/19  0540   WBC 4.9 5.1 5.1   HGB 14.2 13.7 14.1   HCT 43.3 43.2 43.6    167 167     Recent Labs     07/02/19  0237 07/01/19  1424 07/01/19  0427 06/30/19  0540   NA 140  --  141 142   K 3.7 4.1 3.4* 4.0     --  106 108   CO2 25  --  27 23   GLU 96  --  109* 93   BUN 12  --  14 14   CREA 1.15  --  1.05 1.18   CA 9.2  --  8.8 8.8       Signed By: Maxx León MD     July 2, 2019

## 2019-07-02 NOTE — DISCHARGE INSTRUCTIONS

## 2019-07-02 NOTE — ROUTINE PROCESS
Bedside and Verbal shift change report given to TRE Tejeda R.N. (oncoming nurse) by LELIA Reyes R.N. (offgoing nurse). Report included the following information SBAR, Kardex, Intake/Output, MAR, Accordion, Recent Results and Med Rec Status.

## 2019-07-02 NOTE — PROGRESS NOTES
0145 - per tele RN, 12 beat run Vtach noted 0135. 8 beat vtach noted 0145.    0205 - attempts to page MD via answering service unsuccessful as no answer after 5 min hold time. Charge nurse phoned ICU and able to leave message to return call at earliest convenience. Will follow. 0210 - return call rec'd from hospitalist.  New orders to give 1g Mg+ IV now and have AM labs drawn now. 0076 - call made to lab to request lab draw now per MD order. Also call to pharmacy to confirm compatibility of continuous heparin with Mg as pt only has one PIV site. Compatibility confirmed. 0225 -  at bs / draw completed. 0420 - aPTT noted to be 68.2. New order entered per protocol for one time bolus. 0189 - bolus administered and rate adjusted +2units/kg/hr. Anabella Salcedo RN in for double check.

## 2019-07-02 NOTE — PROGRESS NOTES
Discharge instructions reviewed with the patient. Patient verbalized understanding and verified by teach back. All questions answered. IV discontinued, no redness, swelling or pain noted. Patient awaiting daughter for transportation home, with an ETA of 35 minutes. Patient armband removed and shredded.

## 2019-07-02 NOTE — PROGRESS NOTES
1934 report received from [address] [place] [address]. Pt sitting on side of bed no distress and no complaints of pain. IV heparin verified with Omid Flores RN    9151 RN informed that pt IV heparin was turned off. SN entered into room and asked pt who turned off IV heparin. Pt states he does not know. Last time RN verified IV heparin was still running was at 2215.  New bag hung and verified with Yana Bonner RN    2520 Report given to Mary Cee RN   2618 Heparin gtt rate verified with Mary Cee RN

## 2019-07-02 NOTE — PROGRESS NOTES
0730:Received verbal bedside report from off going nurse SHIRIN Juan Patient care received. Patient alert and oriented x 4. Patient resting in bed denies pain. Patient stable. Call light with in reach bed in lowest position.

## 2019-07-03 ENCOUNTER — HOME CARE VISIT (OUTPATIENT)
Dept: HOME HEALTH SERVICES | Facility: HOME HEALTH | Age: 64
End: 2019-07-03

## 2019-07-06 ENCOUNTER — HOME CARE VISIT (OUTPATIENT)
Dept: HOME HEALTH SERVICES | Facility: HOME HEALTH | Age: 64
End: 2019-07-06

## 2021-08-03 PROBLEM — I48.91 ATRIAL FIBRILLATION (HCC): Status: RESOLVED | Noted: 2019-06-30 | Resolved: 2021-08-03

## 2022-10-17 ENCOUNTER — TRANSCRIBE ORDER (OUTPATIENT)
Dept: REGISTRATION | Age: 67
End: 2022-10-17

## 2022-10-17 ENCOUNTER — HOSPITAL ENCOUNTER (OUTPATIENT)
Dept: NON INVASIVE DIAGNOSTICS | Age: 67
Discharge: HOME | End: 2022-10-17
Payer: COMMERCIAL

## 2022-10-17 ENCOUNTER — HOSPITAL ENCOUNTER (OUTPATIENT)
Dept: LAB | Age: 67
Discharge: HOME | End: 2022-10-17
Payer: COMMERCIAL

## 2022-10-17 DIAGNOSIS — Z01.818 OTHER SPECIFIED PRE-OPERATIVE EXAMINATION: Primary | ICD-10-CM

## 2022-10-17 DIAGNOSIS — Z01.818 OTHER SPECIFIED PRE-OPERATIVE EXAMINATION: ICD-10-CM

## 2022-10-17 LAB
HCT VFR BLD AUTO: 44.1 % (ref 36–48)
HGB BLD-MCNC: 14.5 G/DL (ref 13–16)
POTASSIUM SERPL-SCNC: 3.6 MMOL/L (ref 3.5–5.5)

## 2022-10-17 PROCEDURE — 36415 COLL VENOUS BLD VENIPUNCTURE: CPT

## 2022-10-17 PROCEDURE — 85018 HEMOGLOBIN: CPT

## 2022-10-17 PROCEDURE — 93005 ELECTROCARDIOGRAM TRACING: CPT

## 2022-10-17 PROCEDURE — 84132 ASSAY OF SERUM POTASSIUM: CPT

## 2022-10-18 LAB
CALCULATED R AXIS, ECG10: 68 DEGREES
CALCULATED T AXIS, ECG11: 44 DEGREES
DIAGNOSIS, 93000: NORMAL
Q-T INTERVAL, ECG07: 410 MS
QRS DURATION, ECG06: 96 MS
QTC CALCULATION (BEZET), ECG08: 439 MS
VENTRICULAR RATE, ECG03: 69 BPM

## 2022-10-26 ENCOUNTER — HOSPITAL ENCOUNTER (OUTPATIENT)
Dept: LAB | Age: 67
Discharge: HOME | End: 2022-10-26
Payer: COMMERCIAL

## 2022-10-26 PROCEDURE — 88304 TISSUE EXAM BY PATHOLOGIST: CPT

## 2022-10-26 PROCEDURE — 88305 TISSUE EXAM BY PATHOLOGIST: CPT

## 2023-08-06 NOTE — ROUTINE PROCESS
Dual AVS reviewed with Leatha Cantu RN. All medications reviewed individually with patient. Opportunities for questions and concerns provided. Patient discharged via (mode of transport ie. Car, ambulance or air transport) car with family. Patient's arm band appropriately discarded. 06-Aug-2023